# Patient Record
Sex: MALE | Race: BLACK OR AFRICAN AMERICAN | NOT HISPANIC OR LATINO | Employment: OTHER | URBAN - METROPOLITAN AREA
[De-identification: names, ages, dates, MRNs, and addresses within clinical notes are randomized per-mention and may not be internally consistent; named-entity substitution may affect disease eponyms.]

---

## 2017-01-16 ENCOUNTER — OFFICE VISIT (OUTPATIENT)
Dept: FAMILY MEDICINE CLINIC | Facility: CLINIC | Age: 72
End: 2017-01-16

## 2017-01-16 ENCOUNTER — HOSPITAL ENCOUNTER (OUTPATIENT)
Dept: GENERAL RADIOLOGY | Facility: HOSPITAL | Age: 72
Discharge: HOME | End: 2017-01-16
Attending: GENERAL PRACTICE | Admitting: GENERAL PRACTICE

## 2017-01-16 VITALS
TEMPERATURE: 98.2 F | BODY MASS INDEX: 28.09 KG/M2 | OXYGEN SATURATION: 98 % | SYSTOLIC BLOOD PRESSURE: 142 MMHG | DIASTOLIC BLOOD PRESSURE: 90 MMHG | WEIGHT: 179 LBS | HEIGHT: 67 IN | HEART RATE: 69 BPM

## 2017-01-16 DIAGNOSIS — M89.8X9 BONE PAIN: Primary | ICD-10-CM

## 2017-01-16 DIAGNOSIS — F17.200 SMOKER: ICD-10-CM

## 2017-01-16 DIAGNOSIS — R52 PAIN: ICD-10-CM

## 2017-01-16 PROCEDURE — 99213 OFFICE O/P EST LOW 20 MIN: CPT | Performed by: GENERAL PRACTICE

## 2017-01-16 PROCEDURE — 71020 HC CHEST PA AND LATERAL: CPT

## 2017-01-16 PROCEDURE — 93000 ELECTROCARDIOGRAM COMPLETE: CPT | Performed by: GENERAL PRACTICE

## 2017-01-16 RX ORDER — ASPIRIN 325 MG
325 TABLET ORAL DAILY
COMMUNITY
End: 2022-12-07

## 2017-01-16 NOTE — PROGRESS NOTES
Subjective   Mitesh Durham is a 71 y.o. male.     Chief Complaint   Patient presents with   • Chest Pain     Dull ache    • Sleeping Problem       History of Present Illness patient is a 71 y.o. very nice -American gentleman who is complaining of a pain around the fourth to fifth costochondral joint on the right side of the sternum been there for 6 weeks no history of injury this patient is a smoker has been for years this is not pre-cardial pain but it looks like may need an EKG to make sure                    The following portions of the patient's history were reviewed and updated as appropriate: allergies, current medications, past family history, past medical history, past social history, past surgical history and problem list.      Review of Systems   Respiratory:        Patient is a smoker   Musculoskeletal:        Costochondral pain fourth and fifth rib the right side for 4-6 weeks no history of injury         Objective   Physical Exam   Cardiovascular: Normal rate, regular rhythm and normal heart sounds.    Pulmonary/Chest: Effort normal and breath sounds normal.   Is a long-time smoker   Musculoskeletal:   Shouldn't complains of the pain and definitely to pressure in the area of the fourth the fifth costochondral joint on the right side of the sternum I may feel a bony enlargement in that area hard to tell we'll send a [facility] for rib detail in that area also plain films of the chest because he is a smoker         Assessment/Plan   Mitesh was seen today for chest pain and sleeping problem.    Diagnoses and all orders for this visit:    Bone pain  -     ECG 12 Lead    Smoker  -     ECG 12 Lead              Mitesh Blackwood MD  1/16/2017     Very nice -American 71-year-old lad who is here complaining of pain it appears to be at the fourth or fifth costochondral joint right side of the sternum which he says is been there for maybe 6 weeks no history of injury this patient is a smoker.  There is  pain with pressure over this area I'm going to do a EKG just to make sure do not feel this is cardiac then I will send him to [facility] for rib detail in that area on the right side of the sternum fifth or fourth rib costochondral joint also we will do a PA and lateral of the chest because he is a smoker patient's EKG was perfectly normal he is gone to [facility] for x-rays

## 2017-01-17 ENCOUNTER — TELEPHONE (OUTPATIENT)
Dept: FAMILY MEDICINE CLINIC | Facility: CLINIC | Age: 72
End: 2017-01-17

## 2017-01-17 ENCOUNTER — HOSPITAL ENCOUNTER (OUTPATIENT)
Dept: GENERAL RADIOLOGY | Facility: HOSPITAL | Age: 72
Discharge: HOME | End: 2017-01-17
Attending: GENERAL PRACTICE | Admitting: GENERAL PRACTICE

## 2017-01-17 DIAGNOSIS — R07.81 RIB PAIN ON RIGHT SIDE: ICD-10-CM

## 2017-01-17 DIAGNOSIS — F17.200 SMOKER: ICD-10-CM

## 2017-01-17 PROCEDURE — 71100 X-RAY EXAM RIBS UNI 2 VIEWS: CPT

## 2017-01-17 NOTE — TELEPHONE ENCOUNTER
I made a call to the x-ray department because rib detail that I had ordered was not done I did order lateral and PA of his chest but also reviewed detail they are going to call the patient bring him back because some reason they did not do the films that as I had ordered

## 2017-01-18 ENCOUNTER — TELEPHONE (OUTPATIENT)
Dept: FAMILY MEDICINE CLINIC | Facility: CLINIC | Age: 72
End: 2017-01-18

## 2017-01-18 NOTE — TELEPHONE ENCOUNTER
Patient was aware yesterday that his chest x-ray was okay and told him to quit smoking calling today to let him know that his rib detail films are normal there is no fracture there are no bony lesions anywhere's patient in the area he is complaining which is a costochondral joint next to the sternum told the patient again on some Aleve maybe twice a day and get back with me in a couple weeks to be still having discomfort I did tell him on the chest x-ray there was some evidence of COPD and that is from the smoking he must quit smoking

## 2020-06-09 ENCOUNTER — PATIENT OUTREACH (OUTPATIENT)
Dept: CASE MANAGEMENT | Facility: OTHER | Age: 75
End: 2020-06-09

## 2020-06-09 NOTE — OUTREACH NOTE
LVM for pt to return call to schedule AWARMAND Panchal  [facility] Clinical Coordinator  [phone]

## 2020-06-10 NOTE — OUTREACH NOTE
Spoke with pts wife, she states that pt is currently having surgery at [facility] and would like to postpone AWV for now.     ARMAND Allen  Network Clinical Coordinator  [phone]

## 2021-02-16 ENCOUNTER — PATIENT OUTREACH (OUTPATIENT)
Dept: PHARMACY | Facility: HOSPITAL | Age: 76
End: 2021-02-16

## 2022-12-07 ENCOUNTER — HOSPITAL ENCOUNTER (OUTPATIENT)
Dept: GENERAL RADIOLOGY | Facility: HOSPITAL | Age: 77
Discharge: HOME | End: 2022-12-07

## 2022-12-07 ENCOUNTER — PRE-ADMISSION TESTING (OUTPATIENT)
Dept: PREADMISSION TESTING | Facility: HOSPITAL | Age: 77
End: 2022-12-07

## 2022-12-07 VITALS
HEIGHT: 67 IN | OXYGEN SATURATION: 97 % | HEART RATE: 94 BPM | WEIGHT: 179.8 LBS | SYSTOLIC BLOOD PRESSURE: 163 MMHG | BODY MASS INDEX: 28.22 KG/M2 | TEMPERATURE: 97.5 F | RESPIRATION RATE: 20 BRPM | DIASTOLIC BLOOD PRESSURE: 82 MMHG

## 2022-12-07 LAB
ABO GROUP BLD: NORMAL
ALBUMIN SERPL-MCNC: 3.9 G/DL (ref 3.5–5.2)
ALBUMIN/GLOB SERPL: 1.6 G/DL
ALP SERPL-CCNC: 96 U/L (ref 39–117)
ALT SERPL W P-5'-P-CCNC: 10 U/L (ref 1–41)
ANION GAP SERPL CALCULATED.3IONS-SCNC: 10 MMOL/L (ref 5–15)
AST SERPL-CCNC: 12 U/L (ref 1–40)
BILIRUB SERPL-MCNC: 0.7 MG/DL (ref 0–1.2)
BLD GP AB SCN SERPL QL: NEGATIVE
BUN SERPL-MCNC: 15 MG/DL (ref 8–23)
BUN/CREAT SERPL: 11.8 (ref 7–25)
CALCIUM SPEC-SCNC: 9 MG/DL (ref 8.6–10.5)
CHLORIDE SERPL-SCNC: 104 MMOL/L (ref 98–107)
CO2 SERPL-SCNC: 27 MMOL/L (ref 22–29)
CREAT SERPL-MCNC: 1.27 MG/DL (ref 0.76–1.27)
DEPRECATED RDW RBC AUTO: 38.4 FL (ref 37–54)
EGFRCR SERPLBLD CKD-EPI 2021: 57.8 ML/MIN/1.73
ERYTHROCYTE [DISTWIDTH] IN BLOOD BY AUTOMATED COUNT: 12.1 % (ref 12.3–15.4)
GLOBULIN UR ELPH-MCNC: 2.5 GM/DL
GLUCOSE SERPL-MCNC: 102 MG/DL (ref 65–99)
HBA1C MFR BLD: 6.2 % (ref 4.8–5.6)
HCT VFR BLD AUTO: 40.6 % (ref 37.5–51)
HGB BLD-MCNC: 13.1 G/DL (ref 13–17.7)
INR PPP: 1.01 (ref 0.9–1.1)
MCH RBC QN AUTO: 27.8 PG (ref 26.6–33)
MCHC RBC AUTO-ENTMCNC: 32.3 G/DL (ref 31.5–35.7)
MCV RBC AUTO: 86.2 FL (ref 79–97)
PLATELET # BLD AUTO: 248 10*3/MM3 (ref 140–450)
PMV BLD AUTO: 9.6 FL (ref 6–12)
POTASSIUM SERPL-SCNC: 4.3 MMOL/L (ref 3.5–5.2)
PROT SERPL-MCNC: 6.4 G/DL (ref 6–8.5)
PROTHROMBIN TIME: 13.4 SECONDS (ref 11.7–14.2)
QT INTERVAL: 370 MS
RBC # BLD AUTO: 4.71 10*6/MM3 (ref 4.14–5.8)
RH BLD: POSITIVE
SODIUM SERPL-SCNC: 141 MMOL/L (ref 136–145)
T&S EXPIRATION DATE: NORMAL
WBC NRBC COR # BLD: 11.72 10*3/MM3 (ref 3.4–10.8)

## 2022-12-07 PROCEDURE — 80053 COMPREHEN METABOLIC PANEL: CPT

## 2022-12-07 PROCEDURE — 86850 RBC ANTIBODY SCREEN: CPT

## 2022-12-07 PROCEDURE — 71046 X-RAY EXAM CHEST 2 VIEWS: CPT

## 2022-12-07 PROCEDURE — 85027 COMPLETE CBC AUTOMATED: CPT

## 2022-12-07 PROCEDURE — 73502 X-RAY EXAM HIP UNI 2-3 VIEWS: CPT

## 2022-12-07 PROCEDURE — 86900 BLOOD TYPING SEROLOGIC ABO: CPT

## 2022-12-07 PROCEDURE — 93005 ELECTROCARDIOGRAM TRACING: CPT

## 2022-12-07 PROCEDURE — 85610 PROTHROMBIN TIME: CPT

## 2022-12-07 PROCEDURE — 36415 COLL VENOUS BLD VENIPUNCTURE: CPT

## 2022-12-07 PROCEDURE — 93010 ELECTROCARDIOGRAM REPORT: CPT | Performed by: INTERNAL MEDICINE

## 2022-12-07 PROCEDURE — 83036 HEMOGLOBIN GLYCOSYLATED A1C: CPT

## 2022-12-07 PROCEDURE — 86901 BLOOD TYPING SEROLOGIC RH(D): CPT

## 2022-12-07 RX ORDER — TRAMADOL HYDROCHLORIDE 50 MG/1
50 TABLET ORAL NIGHTLY
COMMUNITY

## 2022-12-07 ASSESSMENT — HOOS JR
HOOS JR SCORE: 7
HOOS JR SCORE: 68.284

## 2022-12-07 NOTE — DISCHARGE INSTRUCTIONS
Take the following medications the morning of surgery:  AMLODIPINE(NORVASC)  IF YOU ARE TAKING THIS    ARRIVE TO OUTPATIENT SURGERY CENTER 12-15-22 AT 5:15 AM    If you are on prescription narcotic pain medication to control your pain you may also take that medication the morning of surgery.    General Instructions:  Do not eat solid food after midnight the night before surgery.  You may drink clear liquids day of surgery but must stop at least one hour before your hospital arrival time.  It is beneficial for you to have a clear drink that contains carbohydrates the day of surgery.  We suggest a 12 to 20 ounce bottle of Gatorade or Powerade for non-diabetic patients or a 12 to 20 ounce bottle of G2 or Powerade Zero for diabetic patients. (Pediatric patients, are not advised to drink a 12 to 20 ounce carbohydrate drink)    Clear liquids are liquids you can see through.  Nothing red in color.     Plain water                               Sports drinks  Sodas                                   Gelatin (Jell-O)  Fruit juices without pulp such as white grape juice and apple juice  Popsicles that contain no fruit or yogurt  Tea or coffee (no cream or milk added)  Gatorade / Powerade  G2 / Powerade Zero    Infants may have breast milk up to four hours before surgery.  Infants drinking formula may drink formula up to six hours before surgery.   Patients who avoid smoking, chewing tobacco and alcohol for 4 weeks prior to surgery have a reduced risk of post-operative complications.  Quit smoking as many days before surgery as you can.  Do not smoke, use chewing tobacco or drink alcohol the day of surgery.   If applicable bring your C-PAP/ BI-PAP machine.  Bring any papers given to you in the doctor’s office.  Wear clean comfortable clothes.  Do not wear contact lenses, false eyelashes or make-up.  Bring a case for your glasses.   Bring crutches or walker if applicable.  Remove all piercings.  Leave jewelry and any other  valuables at home.  Hair extensions with metal clips must be removed prior to surgery.  The Pre-Admission Testing nurse will instruct you to bring medications if unable to obtain an accurate list in Pre-Admission Testing.        If you were given a blood bank ID arm band remember to bring it with you the day of surgery.    Preventing a Surgical Site Infection:  For 2 to 3 days before surgery, avoid shaving with a razor because the razor can irritate skin and make it easier to develop an infection.    Any areas of open skin can increase the risk of a post-operative wound infection by allowing bacteria to enter and travel throughout the body.  Notify your surgeon if you have any skin wounds / rashes even if it is not near the expected surgical site.  The area will need assessed to determine if surgery should be delayed until it is healed.  The night prior to surgery shower using a fresh bar of anti-bacterial soap (such as Dial) and clean washcloth.  Sleep in a clean bed with clean clothing.  Do not allow pets to sleep with you.  Shower on the morning of surgery using a fresh bar of anti-bacterial soap (such as Dial) and clean washcloth.  Dry with a clean towel and dress in clean clothing.  Ask your surgeon if you will be receiving antibiotics prior to surgery.  Make sure you, your family, and all healthcare providers clean their hands with soap and water or an alcohol based hand  before caring for you or your wound.    Day of surgery:  Your arrival time is approximately two hours before your scheduled surgery time.  Upon arrival, a Pre-op nurse and Anesthesiologist will review your health history, obtain vital signs, and answer questions you may have.  The only belongings needed at this time will be a list of your home medications and if applicable your C-PAP/BI-PAP machine.  A Pre-op nurse will start an IV and you may receive medication in preparation for surgery, including something to help you relax.      Please be aware that surgery does come with discomfort.  We want to make every effort to control your discomfort so please discuss any uncontrolled symptoms with your nurse.   Your doctor will most likely have prescribed pain medications.      If you are going home after surgery you will receive individualized written care instructions before being discharged.  A responsible adult must drive you to and from the hospital on the day of your surgery and stay with you for 24 hours.  Discharge prescriptions can be filled by the hospital pharmacy during regular pharmacy hours.  If you are having surgery late in the day/evening your prescription may be e-prescribed to your pharmacy.  Please verify your pharmacy hours or chose a 24 hour pharmacy to avoid not having access to your prescription because your pharmacy has closed for the day.    If you are staying overnight following surgery, you will be transported to your hospital room following the recovery period.  [facility] has all private rooms.    If you have any questions please call Pre-Admission Testing at [phone].  Deductibles and co-payments are collected on the day of service. Please be prepared to pay the required co-pay, deductible or deposit on the day of service as defined by your plan.    Call your surgeon immediately if you experience any of the following symptoms:  Sore Throat  Shortness of Breath or difficulty breathing  Cough  Chills  Body soreness or muscle pain  Headache  Fever  New loss of taste or smell  Do not arrive for your surgery ill.  Your procedure will need to be rescheduled to another time.  You will need to call your physician before the day of surgery to avoid any unnecessary exposure to hospital staff as well as other patients.       CHLORHEXIDINE CLOTH INSTRUCTIONS  The morning of surgery follow these instructions using the Chlorhexidine cloths you've been given.  These steps reduce bacteria on the body.  Do not  use the cloths near your eyes, ears mouth, genitalia or on open wounds.  Throw the cloths away after use but do not try to flush them down a toilet.      Open and remove one cloth at a time from the package.    Leave the cloth unfolded and begin the bathing.  Massage the skin with the cloths using gentle pressure to remove bacteria.  Do not scrub harshly.   Follow the steps below with one 2% CHG cloth per area (6 total cloths).  One cloth for neck, shoulders and chest.  One cloth for both arms, hands, fingers and underarms (do underarms last).  One cloth for the abdomen followed by groin.  One cloth for right leg and foot including between the toes.  One cloth for left leg and foot including between the toes.  The last cloth is to be used for the back of the neck, back and buttocks.    Allow the CHG to air dry 3 minutes on the skin which will give it time to work and decrease the chance of irritation.  The skin may feel sticky until it is dry.  Do not rinse with water or any other liquid or you will lose the beneficial effects of the CHG.  If mild skin irritation occurs, do rinse the skin to remove the CHG.  Report this to the nurse at time of admission.  Do not apply lotions, creams, ointments, deodorants or perfumes after using the clothes. Dress in clean clothes before coming to the hospital.

## 2022-12-15 ENCOUNTER — APPOINTMENT (OUTPATIENT)
Dept: GENERAL RADIOLOGY | Facility: HOSPITAL | Age: 77
End: 2022-12-15

## 2022-12-15 ENCOUNTER — ANESTHESIA EVENT (OUTPATIENT)
Dept: PERIOP | Facility: HOSPITAL | Age: 77
End: 2022-12-15

## 2022-12-15 ENCOUNTER — HOSPITAL ENCOUNTER (OUTPATIENT)
Facility: HOSPITAL | Age: 77
Discharge: HOME | End: 2022-12-16
Attending: ORTHOPAEDIC SURGERY | Admitting: ORTHOPAEDIC SURGERY

## 2022-12-15 ENCOUNTER — ANESTHESIA (OUTPATIENT)
Dept: PERIOP | Facility: HOSPITAL | Age: 77
End: 2022-12-15

## 2022-12-15 DIAGNOSIS — M16.12 PRIMARY OSTEOARTHRITIS OF LEFT HIP: Primary | ICD-10-CM

## 2022-12-15 PROCEDURE — 73501 X-RAY EXAM HIP UNI 1 VIEW: CPT

## 2022-12-15 PROCEDURE — 25010000002 CEFAZOLIN IN DEXTROSE 2-4 GM/100ML-% SOLUTION: Performed by: ORTHOPAEDIC SURGERY

## 2022-12-15 PROCEDURE — C1713 ANCHOR/SCREW BN/BN,TIS/BN: HCPCS | Performed by: ORTHOPAEDIC SURGERY

## 2022-12-15 PROCEDURE — 25010000002 PROPOFOL 10 MG/ML EMULSION: Performed by: NURSE ANESTHETIST, CERTIFIED REGISTERED

## 2022-12-15 PROCEDURE — 25010000002 FENTANYL CITRATE (PF) 100 MCG/2ML SOLUTION: Performed by: NURSE ANESTHETIST, CERTIFIED REGISTERED

## 2022-12-15 PROCEDURE — 25010000002 MORPHINE PER 10 MG: Performed by: ORTHOPAEDIC SURGERY

## 2022-12-15 PROCEDURE — 25010000002 FENTANYL CITRATE (PF) 50 MCG/ML SOLUTION: Performed by: ANESTHESIOLOGY

## 2022-12-15 PROCEDURE — G0378 HOSPITAL OBSERVATION PER HR: HCPCS

## 2022-12-15 PROCEDURE — 25010000002 EPINEPHRINE 1 MG/ML SOLUTION 30 ML VIAL: Performed by: ORTHOPAEDIC SURGERY

## 2022-12-15 PROCEDURE — C1776 JOINT DEVICE (IMPLANTABLE): HCPCS | Performed by: ORTHOPAEDIC SURGERY

## 2022-12-15 PROCEDURE — 25010000002 KETOROLAC TROMETHAMINE PER 15 MG: Performed by: ORTHOPAEDIC SURGERY

## 2022-12-15 PROCEDURE — 97161 PT EVAL LOW COMPLEX 20 MIN: CPT

## 2022-12-15 PROCEDURE — 97530 THERAPEUTIC ACTIVITIES: CPT

## 2022-12-15 PROCEDURE — 76942 ECHO GUIDE FOR BIOPSY: CPT | Performed by: ORTHOPAEDIC SURGERY

## 2022-12-15 PROCEDURE — 25010000002 CEFAZOLIN IN DEXTROSE 2-4 GM/100ML-% SOLUTION: Performed by: NURSE ANESTHETIST, CERTIFIED REGISTERED

## 2022-12-15 PROCEDURE — 25010000002 HYDROMORPHONE 1 MG/ML SOLUTION: Performed by: NURSE ANESTHETIST, CERTIFIED REGISTERED

## 2022-12-15 PROCEDURE — 25010000002 ROPIVACAINE PER 1 MG: Performed by: ORTHOPAEDIC SURGERY

## 2022-12-15 PROCEDURE — 25010000002 ONDANSETRON PER 1 MG: Performed by: NURSE ANESTHETIST, CERTIFIED REGISTERED

## 2022-12-15 PROCEDURE — 97116 GAIT TRAINING THERAPY: CPT

## 2022-12-15 PROCEDURE — 25010000002 NEOSTIGMINE 5 MG/10ML SOLUTION: Performed by: NURSE ANESTHETIST, CERTIFIED REGISTERED

## 2022-12-15 DEVICE — SHLL ACET OSSEOTI G7 4H SZF 56MM: Type: IMPLANTABLE DEVICE | Site: HIP | Status: FUNCTIONAL

## 2022-12-15 DEVICE — TOTAL HIP PRIMARY: Type: IMPLANTABLE DEVICE | Site: HIP | Status: FUNCTIONAL

## 2022-12-15 DEVICE — STEM FEM/HIP TAPERLOC COMPL DIST/REDUC PPS OFFST/STD SZ13: Type: IMPLANTABLE DEVICE | Site: HIP | Status: FUNCTIONAL

## 2022-12-15 DEVICE — BONE WAX
Type: IMPLANTABLE DEVICE | Site: HIP | Status: FUNCTIONAL
Brand: ETHICON

## 2022-12-15 DEVICE — IMPLANTABLE DEVICE
Type: IMPLANTABLE DEVICE | Site: HIP | Status: FUNCTIONAL
Brand: BIOLOX® OPTION

## 2022-12-15 DEVICE — IMPLANTABLE DEVICE
Type: IMPLANTABLE DEVICE | Site: HIP | Status: FUNCTIONAL
Brand: BIOLOX® DELTA OPTION

## 2022-12-15 DEVICE — SCRW ACET CORT TRILOGY S/TAP 6.5X35: Type: IMPLANTABLE DEVICE | Site: HIP | Status: FUNCTIONAL

## 2022-12-15 DEVICE — SCRW ACET CORT TRILOGY S/TAP 6.5X25: Type: IMPLANTABLE DEVICE | Site: HIP | Status: FUNCTIONAL

## 2022-12-15 DEVICE — CP HIP UPCHRG OSSEOTI LTD HL CUPS: Type: IMPLANTABLE DEVICE | Site: HIP | Status: FUNCTIONAL

## 2022-12-15 DEVICE — LINER ACET G7 HI/WL E1 SZF 40MM: Type: IMPLANTABLE DEVICE | Site: HIP | Status: FUNCTIONAL

## 2022-12-15 RX ORDER — NALOXONE HCL 0.4 MG/ML
0.4 VIAL (ML) INJECTION
Status: DISCONTINUED | OUTPATIENT
Start: 2022-12-15 | End: 2022-12-16 | Stop reason: HOSPADM

## 2022-12-15 RX ORDER — OXYCODONE AND ACETAMINOPHEN 7.5; 325 MG/1; MG/1
1 TABLET ORAL ONCE AS NEEDED
Status: DISCONTINUED | OUTPATIENT
Start: 2022-12-15 | End: 2022-12-15 | Stop reason: HOSPADM

## 2022-12-15 RX ORDER — SODIUM CHLORIDE, SODIUM LACTATE, POTASSIUM CHLORIDE, CALCIUM CHLORIDE 600; 310; 30; 20 MG/100ML; MG/100ML; MG/100ML; MG/100ML
9 INJECTION, SOLUTION INTRAVENOUS CONTINUOUS
Status: DISCONTINUED | OUTPATIENT
Start: 2022-12-15 | End: 2022-12-16 | Stop reason: HOSPADM

## 2022-12-15 RX ORDER — MORPHINE SULFATE 4 MG/ML
4 INJECTION, SOLUTION INTRAMUSCULAR; INTRAVENOUS
Status: DISCONTINUED | OUTPATIENT
Start: 2022-12-15 | End: 2022-12-16 | Stop reason: HOSPADM

## 2022-12-15 RX ORDER — ONDANSETRON 4 MG/1
4 TABLET, FILM COATED ORAL EVERY 6 HOURS PRN
Status: DISCONTINUED | OUTPATIENT
Start: 2022-12-15 | End: 2022-12-16 | Stop reason: HOSPADM

## 2022-12-15 RX ORDER — ONDANSETRON 2 MG/ML
4 INJECTION INTRAMUSCULAR; INTRAVENOUS EVERY 6 HOURS PRN
Status: DISCONTINUED | OUTPATIENT
Start: 2022-12-15 | End: 2022-12-16 | Stop reason: HOSPADM

## 2022-12-15 RX ORDER — LABETALOL HYDROCHLORIDE 5 MG/ML
5 INJECTION, SOLUTION INTRAVENOUS
Status: DISCONTINUED | OUTPATIENT
Start: 2022-12-15 | End: 2022-12-15 | Stop reason: HOSPADM

## 2022-12-15 RX ORDER — OXYCODONE HYDROCHLORIDE AND ACETAMINOPHEN 5; 325 MG/1; MG/1
1-2 TABLET ORAL EVERY 4 HOURS PRN
Qty: 60 TABLET | Refills: 0 | Status: SHIPPED | OUTPATIENT
Start: 2022-12-15

## 2022-12-15 RX ORDER — CHOLECALCIFEROL (VITAMIN D3) 125 MCG
5 CAPSULE ORAL NIGHTLY PRN
Status: DISCONTINUED | OUTPATIENT
Start: 2022-12-15 | End: 2022-12-16 | Stop reason: HOSPADM

## 2022-12-15 RX ORDER — MAGNESIUM HYDROXIDE 1200 MG/15ML
LIQUID ORAL AS NEEDED
Status: DISCONTINUED | OUTPATIENT
Start: 2022-12-15 | End: 2022-12-15 | Stop reason: HOSPADM

## 2022-12-15 RX ORDER — FAMOTIDINE 10 MG/ML
20 INJECTION, SOLUTION INTRAVENOUS ONCE
Status: COMPLETED | OUTPATIENT
Start: 2022-12-15 | End: 2022-12-15

## 2022-12-15 RX ORDER — ENALAPRILAT 2.5 MG/2ML
1.25 INJECTION INTRAVENOUS ONCE AS NEEDED
Status: DISCONTINUED | OUTPATIENT
Start: 2022-12-15 | End: 2022-12-15 | Stop reason: HOSPADM

## 2022-12-15 RX ORDER — PROMETHAZINE HYDROCHLORIDE 25 MG/1
25 SUPPOSITORY RECTAL ONCE AS NEEDED
Status: DISCONTINUED | OUTPATIENT
Start: 2022-12-15 | End: 2022-12-15 | Stop reason: HOSPADM

## 2022-12-15 RX ORDER — OXYCODONE HYDROCHLORIDE AND ACETAMINOPHEN 5; 325 MG/1; MG/1
2 TABLET ORAL EVERY 4 HOURS PRN
Status: DISCONTINUED | OUTPATIENT
Start: 2022-12-15 | End: 2022-12-16 | Stop reason: HOSPADM

## 2022-12-15 RX ORDER — GLYCOPYRROLATE 0.2 MG/ML
INJECTION INTRAMUSCULAR; INTRAVENOUS AS NEEDED
Status: DISCONTINUED | OUTPATIENT
Start: 2022-12-15 | End: 2022-12-15 | Stop reason: SURG

## 2022-12-15 RX ORDER — CEFAZOLIN SODIUM 2 G/100ML
INJECTION, SOLUTION INTRAVENOUS AS NEEDED
Status: DISCONTINUED | OUTPATIENT
Start: 2022-12-15 | End: 2022-12-15 | Stop reason: SURG

## 2022-12-15 RX ORDER — ASPIRIN 81 MG/1
81 TABLET ORAL DAILY
Qty: 45 TABLET | Refills: 0 | Status: SHIPPED | OUTPATIENT
Start: 2022-12-16

## 2022-12-15 RX ORDER — LISINOPRIL 20 MG/1
20 TABLET ORAL DAILY
Status: DISCONTINUED | OUTPATIENT
Start: 2022-12-15 | End: 2022-12-16 | Stop reason: HOSPADM

## 2022-12-15 RX ORDER — HYDROMORPHONE HYDROCHLORIDE 1 MG/ML
0.25 INJECTION, SOLUTION INTRAMUSCULAR; INTRAVENOUS; SUBCUTANEOUS
Status: DISCONTINUED | OUTPATIENT
Start: 2022-12-15 | End: 2022-12-15 | Stop reason: HOSPADM

## 2022-12-15 RX ORDER — ONDANSETRON 2 MG/ML
4 INJECTION INTRAMUSCULAR; INTRAVENOUS ONCE AS NEEDED
Status: DISCONTINUED | OUTPATIENT
Start: 2022-12-15 | End: 2022-12-15 | Stop reason: HOSPADM

## 2022-12-15 RX ORDER — DIPHENHYDRAMINE HCL 25 MG
25 CAPSULE ORAL EVERY 6 HOURS PRN
Status: DISCONTINUED | OUTPATIENT
Start: 2022-12-15 | End: 2022-12-16 | Stop reason: HOSPADM

## 2022-12-15 RX ORDER — KETOROLAC TROMETHAMINE 15 MG/ML
15 INJECTION, SOLUTION INTRAMUSCULAR; INTRAVENOUS EVERY 6 HOURS PRN
Status: DISCONTINUED | OUTPATIENT
Start: 2022-12-15 | End: 2022-12-16 | Stop reason: HOSPADM

## 2022-12-15 RX ORDER — NALOXONE HCL 0.4 MG/ML
0.4 VIAL (ML) INJECTION AS NEEDED
Status: DISCONTINUED | OUTPATIENT
Start: 2022-12-15 | End: 2022-12-15 | Stop reason: HOSPADM

## 2022-12-15 RX ORDER — ACETAMINOPHEN 325 MG/1
325 TABLET ORAL EVERY 4 HOURS PRN
Status: DISCONTINUED | OUTPATIENT
Start: 2022-12-15 | End: 2022-12-16 | Stop reason: HOSPADM

## 2022-12-15 RX ORDER — ONDANSETRON 2 MG/ML
INJECTION INTRAMUSCULAR; INTRAVENOUS AS NEEDED
Status: DISCONTINUED | OUTPATIENT
Start: 2022-12-15 | End: 2022-12-15 | Stop reason: SURG

## 2022-12-15 RX ORDER — FENTANYL CITRATE 50 UG/ML
50 INJECTION, SOLUTION INTRAMUSCULAR; INTRAVENOUS
Status: DISCONTINUED | OUTPATIENT
Start: 2022-12-15 | End: 2022-12-15 | Stop reason: HOSPADM

## 2022-12-15 RX ORDER — PROMETHAZINE HYDROCHLORIDE 12.5 MG/1
12.5 TABLET ORAL EVERY 6 HOURS PRN
Status: DISCONTINUED | OUTPATIENT
Start: 2022-12-15 | End: 2022-12-16 | Stop reason: HOSPADM

## 2022-12-15 RX ORDER — TRAMADOL HYDROCHLORIDE 50 MG/1
50 TABLET ORAL EVERY 4 HOURS PRN
Status: DISCONTINUED | OUTPATIENT
Start: 2022-12-15 | End: 2022-12-16 | Stop reason: HOSPADM

## 2022-12-15 RX ORDER — DOCUSATE SODIUM 250 MG
250 CAPSULE ORAL 2 TIMES DAILY PRN
Qty: 30 CAPSULE | Refills: 1 | Status: SHIPPED | OUTPATIENT
Start: 2022-12-15

## 2022-12-15 RX ORDER — PROMETHAZINE HYDROCHLORIDE 25 MG/1
25 TABLET ORAL ONCE AS NEEDED
Status: DISCONTINUED | OUTPATIENT
Start: 2022-12-15 | End: 2022-12-15 | Stop reason: HOSPADM

## 2022-12-15 RX ORDER — POVIDONE-IODINE 10 MG/ML
1 SOLUTION TOPICAL ONCE
Status: COMPLETED | OUTPATIENT
Start: 2022-12-15 | End: 2022-12-15

## 2022-12-15 RX ORDER — ASPIRIN 81 MG/1
81 TABLET ORAL DAILY
Status: DISCONTINUED | OUTPATIENT
Start: 2022-12-16 | End: 2022-12-16 | Stop reason: HOSPADM

## 2022-12-15 RX ORDER — CLOPIDOGREL BISULFATE 75 MG/1
75 TABLET ORAL DAILY
Status: DISCONTINUED | OUTPATIENT
Start: 2022-12-16 | End: 2022-12-16 | Stop reason: HOSPADM

## 2022-12-15 RX ORDER — MIDAZOLAM HYDROCHLORIDE 1 MG/ML
0.5 INJECTION INTRAMUSCULAR; INTRAVENOUS
Status: DISCONTINUED | OUTPATIENT
Start: 2022-12-15 | End: 2022-12-15 | Stop reason: HOSPADM

## 2022-12-15 RX ORDER — SODIUM CHLORIDE 0.9 % (FLUSH) 0.9 %
3 SYRINGE (ML) INJECTION EVERY 12 HOURS SCHEDULED
Status: DISCONTINUED | OUTPATIENT
Start: 2022-12-15 | End: 2022-12-15 | Stop reason: HOSPADM

## 2022-12-15 RX ORDER — SODIUM CHLORIDE 9 MG/ML
40 INJECTION, SOLUTION INTRAVENOUS AS NEEDED
Status: DISCONTINUED | OUTPATIENT
Start: 2022-12-15 | End: 2022-12-16 | Stop reason: HOSPADM

## 2022-12-15 RX ORDER — DIPHENHYDRAMINE HYDROCHLORIDE 50 MG/ML
12.5 INJECTION INTRAMUSCULAR; INTRAVENOUS
Status: DISCONTINUED | OUTPATIENT
Start: 2022-12-15 | End: 2022-12-15 | Stop reason: HOSPADM

## 2022-12-15 RX ORDER — ROCURONIUM BROMIDE 10 MG/ML
INJECTION, SOLUTION INTRAVENOUS AS NEEDED
Status: DISCONTINUED | OUTPATIENT
Start: 2022-12-15 | End: 2022-12-15 | Stop reason: SURG

## 2022-12-15 RX ORDER — SODIUM CHLORIDE 0.9 % (FLUSH) 0.9 %
3-10 SYRINGE (ML) INJECTION AS NEEDED
Status: DISCONTINUED | OUTPATIENT
Start: 2022-12-15 | End: 2022-12-15 | Stop reason: HOSPADM

## 2022-12-15 RX ORDER — BUPIVACAINE HYDROCHLORIDE AND EPINEPHRINE 2.5; 5 MG/ML; UG/ML
INJECTION, SOLUTION EPIDURAL; INFILTRATION; INTRACAUDAL; PERINEURAL
Status: COMPLETED | OUTPATIENT
Start: 2022-12-15 | End: 2022-12-15

## 2022-12-15 RX ORDER — CEFAZOLIN SODIUM 2 G/100ML
2 INJECTION, SOLUTION INTRAVENOUS ONCE
Status: COMPLETED | OUTPATIENT
Start: 2022-12-15 | End: 2022-12-15

## 2022-12-15 RX ORDER — TEMAZEPAM 15 MG/1
30 CAPSULE ORAL NIGHTLY PRN
Status: DISCONTINUED | OUTPATIENT
Start: 2022-12-15 | End: 2022-12-16 | Stop reason: HOSPADM

## 2022-12-15 RX ORDER — NEOSTIGMINE METHYLSULFATE 0.5 MG/ML
INJECTION, SOLUTION INTRAVENOUS AS NEEDED
Status: DISCONTINUED | OUTPATIENT
Start: 2022-12-15 | End: 2022-12-15 | Stop reason: SURG

## 2022-12-15 RX ORDER — LIDOCAINE HYDROCHLORIDE 20 MG/ML
INJECTION, SOLUTION INFILTRATION; PERINEURAL AS NEEDED
Status: DISCONTINUED | OUTPATIENT
Start: 2022-12-15 | End: 2022-12-15 | Stop reason: SURG

## 2022-12-15 RX ORDER — CLINDAMYCIN PHOSPHATE 900 MG/50ML
900 INJECTION INTRAVENOUS EVERY 8 HOURS
Status: COMPLETED | OUTPATIENT
Start: 2022-12-15 | End: 2022-12-15

## 2022-12-15 RX ORDER — ACETAMINOPHEN 500 MG
1000 TABLET ORAL ONCE
Status: COMPLETED | OUTPATIENT
Start: 2022-12-15 | End: 2022-12-15

## 2022-12-15 RX ORDER — DIAZEPAM 5 MG/1
5 TABLET ORAL EVERY 6 HOURS PRN
Status: DISCONTINUED | OUTPATIENT
Start: 2022-12-15 | End: 2022-12-16 | Stop reason: HOSPADM

## 2022-12-15 RX ORDER — LIDOCAINE HYDROCHLORIDE 10 MG/ML
0.5 INJECTION, SOLUTION EPIDURAL; INFILTRATION; INTRACAUDAL; PERINEURAL ONCE AS NEEDED
Status: DISCONTINUED | OUTPATIENT
Start: 2022-12-15 | End: 2022-12-15 | Stop reason: HOSPADM

## 2022-12-15 RX ORDER — SODIUM CHLORIDE 0.9 % (FLUSH) 0.9 %
10 SYRINGE (ML) INJECTION EVERY 12 HOURS SCHEDULED
Status: DISCONTINUED | OUTPATIENT
Start: 2022-12-15 | End: 2022-12-16 | Stop reason: HOSPADM

## 2022-12-15 RX ORDER — DOCUSATE SODIUM 100 MG/1
100 CAPSULE, LIQUID FILLED ORAL 2 TIMES DAILY PRN
Status: DISCONTINUED | OUTPATIENT
Start: 2022-12-15 | End: 2022-12-16 | Stop reason: HOSPADM

## 2022-12-15 RX ORDER — DIPHENHYDRAMINE HYDROCHLORIDE 50 MG/ML
12.5 INJECTION INTRAMUSCULAR; INTRAVENOUS EVERY 6 HOURS PRN
Status: DISCONTINUED | OUTPATIENT
Start: 2022-12-15 | End: 2022-12-16 | Stop reason: HOSPADM

## 2022-12-15 RX ORDER — SODIUM CHLORIDE 450 MG/100ML
100 INJECTION, SOLUTION INTRAVENOUS CONTINUOUS
Status: DISCONTINUED | OUTPATIENT
Start: 2022-12-15 | End: 2022-12-16 | Stop reason: HOSPADM

## 2022-12-15 RX ORDER — PROPOFOL 10 MG/ML
VIAL (ML) INTRAVENOUS AS NEEDED
Status: DISCONTINUED | OUTPATIENT
Start: 2022-12-15 | End: 2022-12-15 | Stop reason: SURG

## 2022-12-15 RX ORDER — CELECOXIB 200 MG/1
200 CAPSULE ORAL ONCE
Status: COMPLETED | OUTPATIENT
Start: 2022-12-15 | End: 2022-12-15

## 2022-12-15 RX ORDER — SODIUM CHLORIDE 0.9 % (FLUSH) 0.9 %
1-10 SYRINGE (ML) INJECTION AS NEEDED
Status: DISCONTINUED | OUTPATIENT
Start: 2022-12-15 | End: 2022-12-16 | Stop reason: HOSPADM

## 2022-12-15 RX ORDER — HYDROCODONE BITARTRATE AND ACETAMINOPHEN 5; 325 MG/1; MG/1
1 TABLET ORAL ONCE AS NEEDED
Status: DISCONTINUED | OUTPATIENT
Start: 2022-12-15 | End: 2022-12-15 | Stop reason: HOSPADM

## 2022-12-15 RX ORDER — OXYCODONE HYDROCHLORIDE AND ACETAMINOPHEN 5; 325 MG/1; MG/1
1 TABLET ORAL EVERY 4 HOURS PRN
Status: DISCONTINUED | OUTPATIENT
Start: 2022-12-15 | End: 2022-12-16 | Stop reason: HOSPADM

## 2022-12-15 RX ORDER — ATORVASTATIN CALCIUM 20 MG/1
20 TABLET, FILM COATED ORAL NIGHTLY
Status: DISCONTINUED | OUTPATIENT
Start: 2022-12-15 | End: 2022-12-16 | Stop reason: HOSPADM

## 2022-12-15 RX ORDER — FERROUS SULFATE 325(65) MG
325 TABLET ORAL
Status: DISCONTINUED | OUTPATIENT
Start: 2022-12-15 | End: 2022-12-16 | Stop reason: HOSPADM

## 2022-12-15 RX ORDER — TRANEXAMIC ACID 100 MG/ML
INJECTION, SOLUTION INTRAVENOUS AS NEEDED
Status: DISCONTINUED | OUTPATIENT
Start: 2022-12-15 | End: 2022-12-15 | Stop reason: SURG

## 2022-12-15 RX ORDER — FENTANYL CITRATE 50 UG/ML
INJECTION, SOLUTION INTRAMUSCULAR; INTRAVENOUS AS NEEDED
Status: DISCONTINUED | OUTPATIENT
Start: 2022-12-15 | End: 2022-12-15 | Stop reason: SURG

## 2022-12-15 RX ORDER — AMLODIPINE BESYLATE 5 MG/1
5 TABLET ORAL
Status: DISCONTINUED | OUTPATIENT
Start: 2022-12-15 | End: 2022-12-16 | Stop reason: HOSPADM

## 2022-12-15 RX ADMIN — GLYCOPYRROLATE 0.2 MG: 1 INJECTION INTRAMUSCULAR; INTRAVENOUS at 07:31

## 2022-12-15 RX ADMIN — NEOSTIGMINE METHYLSULFATE 2 MG: 0.5 INJECTION INTRAVENOUS at 08:22

## 2022-12-15 RX ADMIN — SODIUM CHLORIDE 100 ML/HR: 4.5 INJECTION, SOLUTION INTRAVENOUS at 11:53

## 2022-12-15 RX ADMIN — CLINDAMYCIN PHOSPHATE 900 MG: 900 INJECTION, SOLUTION INTRAVENOUS at 21:54

## 2022-12-15 RX ADMIN — POVIDONE-IODINE 1 EACH: 10 SOLUTION TOPICAL at 06:28

## 2022-12-15 RX ADMIN — OXYCODONE AND ACETAMINOPHEN 1 TABLET: 5; 325 TABLET ORAL at 19:43

## 2022-12-15 RX ADMIN — GLYCOPYRROLATE 0.3 MG: 1 INJECTION INTRAMUSCULAR; INTRAVENOUS at 08:22

## 2022-12-15 RX ADMIN — SODIUM CHLORIDE, POTASSIUM CHLORIDE, SODIUM LACTATE AND CALCIUM CHLORIDE: 600; 310; 30; 20 INJECTION, SOLUTION INTRAVENOUS at 06:58

## 2022-12-15 RX ADMIN — MUPIROCIN 1 APPLICATION: 20 OINTMENT TOPICAL at 14:21

## 2022-12-15 RX ADMIN — CEFAZOLIN SODIUM 2 G: 2 INJECTION, SOLUTION INTRAVENOUS at 06:52

## 2022-12-15 RX ADMIN — FENTANYL CITRATE 50 MCG: 50 INJECTION, SOLUTION INTRAMUSCULAR; INTRAVENOUS at 06:28

## 2022-12-15 RX ADMIN — PROPOFOL 130 MG: 10 INJECTION, EMULSION INTRAVENOUS at 07:03

## 2022-12-15 RX ADMIN — LIDOCAINE HYDROCHLORIDE 80 MG: 20 INJECTION, SOLUTION INFILTRATION; PERINEURAL at 07:03

## 2022-12-15 RX ADMIN — FERROUS SULFATE TAB 325 MG (65 MG ELEMENTAL FE) 325 MG: 325 (65 FE) TAB at 14:21

## 2022-12-15 RX ADMIN — ACETAMINOPHEN 1000 MG: 500 TABLET ORAL at 05:52

## 2022-12-15 RX ADMIN — FENTANYL CITRATE 50 MCG: 50 INJECTION, SOLUTION INTRAMUSCULAR; INTRAVENOUS at 07:03

## 2022-12-15 RX ADMIN — OXYCODONE AND ACETAMINOPHEN 1 TABLET: 5; 325 TABLET ORAL at 14:51

## 2022-12-15 RX ADMIN — HYDROMORPHONE HYDROCHLORIDE 0.5 MG: 1 INJECTION, SOLUTION INTRAMUSCULAR; INTRAVENOUS; SUBCUTANEOUS at 08:12

## 2022-12-15 RX ADMIN — CLINDAMYCIN PHOSPHATE 900 MG: 900 INJECTION, SOLUTION INTRAVENOUS at 14:21

## 2022-12-15 RX ADMIN — PROPOFOL 40 MG: 10 INJECTION, EMULSION INTRAVENOUS at 08:36

## 2022-12-15 RX ADMIN — ATORVASTATIN CALCIUM 20 MG: 20 TABLET, FILM COATED ORAL at 21:54

## 2022-12-15 RX ADMIN — ROCURONIUM BROMIDE 40 MG: 50 INJECTION, SOLUTION INTRAVENOUS at 07:03

## 2022-12-15 RX ADMIN — HYDROMORPHONE HYDROCHLORIDE 0.5 MG: 1 INJECTION, SOLUTION INTRAMUSCULAR; INTRAVENOUS; SUBCUTANEOUS at 07:58

## 2022-12-15 RX ADMIN — FENTANYL CITRATE 50 MCG: 50 INJECTION, SOLUTION INTRAMUSCULAR; INTRAVENOUS at 07:23

## 2022-12-15 RX ADMIN — CEFAZOLIN SODIUM 2 G: 2 INJECTION, SOLUTION INTRAVENOUS at 08:16

## 2022-12-15 RX ADMIN — ONDANSETRON 4 MG: 2 INJECTION INTRAMUSCULAR; INTRAVENOUS at 08:08

## 2022-12-15 RX ADMIN — CELECOXIB 200 MG: 200 CAPSULE ORAL at 05:51

## 2022-12-15 RX ADMIN — BUPIVACAINE HYDROCHLORIDE AND EPINEPHRINE 50 ML: 2.5; 5 INJECTION, SOLUTION EPIDURAL; INFILTRATION; INTRACAUDAL; PERINEURAL at 06:40

## 2022-12-15 RX ADMIN — MUPIROCIN 1 APPLICATION: 20 OINTMENT TOPICAL at 21:54

## 2022-12-15 RX ADMIN — FAMOTIDINE 20 MG: 10 INJECTION INTRAVENOUS at 06:28

## 2022-12-15 RX ADMIN — TRANEXAMIC ACID 1000 MG: 100 INJECTION, SOLUTION INTRAVENOUS at 07:14

## 2022-12-15 NOTE — THERAPY EVALUATION
Patient Name: Mitesh Durham  : 1945    MRN: [id number]                              Today's Date: 12/15/2022       Admit Date: 12/15/2022    Visit Dx: No diagnosis found.  Patient Active Problem List   Diagnosis   • Right calf pain   • Right leg claudication (HCC)   • Femoral artery occlusion, left (HCC)   • Primary osteoarthritis of left hip     Past Medical History:   Diagnosis Date   • CAD (coronary artery disease)     CABG X3   • History of kidney stones    • Hyperlipidemia    • Hypertension    • MI (myocardial infarction) (HCC)    • OA (osteoarthritis) of hip     LEFT HIP PAIN, LIMITED MOBILITY AND SL WEAKNESS     Past Surgical History:   Procedure Laterality Date   • CARDIAC CATHETERIZATION     • CORONARY ARTERY BYPASS GRAFT  1999    X3   • FRACTURE SURGERY Right     NATTY UPPER ARM   • KIDNEY STONE SURGERY Right     UNCLEAR WHAT PROCEDURE PT HAD DONE   • VASCULAR SURGERY Left     LEFT VEIN SURGERY: UNCLEAR      General Information     Row Name 12/15/22 1455          Physical Therapy Time and Intention    Document Type evaluation  -CS     Mode of Treatment individual therapy;physical therapy  -CS     Row Name 12/15/22 1453          General Information    Patient Profile Reviewed yes  -CS     Prior Level of Function independent:;all household mobility;gait;transfer;bed mobility  -CS     Existing Precautions/Restrictions fall;left;hip, posterior  -CS     Barriers to Rehab none identified  -CS     Row Name 12/15/22 1450          Living Environment    People in Home spouse  -CS     Row Name 12/15/22 1458          Home Main Entrance    Number of Stairs, Main Entrance two  -CS     Stair Railings, Main Entrance railings safe and in good condition  -CS     Row Name 12/15/22 145          Stairs Within Home, Primary    Number of Stairs, Within Home, Primary none  -CS     Row Name 12/15/22 1451          Cognition    Orientation Status (Cognition) oriented x 3  -CS     Row Name 12/15/22 1453          Safety  Issues, Functional Mobility    Impairments Affecting Function (Mobility) endurance/activity tolerance;pain;strength  -CS           User Key  (r) = Recorded By, (t) = Taken By, (c) = Cosigned By    Initials Name Provider Type    CS Amy Larson, DARIO Physical Therapist               Mobility     Row Name 12/15/22 1454          Bed Mobility    Bed Mobility supine-sit;sit-supine  -CS     Supine-Sit [place] (Bed Mobility) standby assist  -CS     Sit-Supine [place] (Bed Mobility) not tested  -CS     Assistive Device (Bed Mobility) bed rails;head of bed elevated  -CS     Comment, (Bed Mobility) UI at end of session  -CS     Row Name 12/15/22 8844          Sit-Stand Transfer    Sit-Stand [place] (Transfers) contact guard;verbal cues  -CS     Assistive Device (Sit-Stand Transfers) walker, front-wheeled  -CS     Comment, (Sit-Stand Transfer) cues for hand placement  -     Row Name 12/15/22 0091          Gait/Stairs (Locomotion)    [place] Level (Gait) contact guard;minimum assist (75% patient effort);verbal cues  -CS     Assistive Device (Gait) walker, front-wheeled  -CS     Distance in Feet (Gait) 60'  -CS     Deviations/Abnormal Patterns (Gait) antalgic;iliana decreased;stride length decreased  -CS     Left Sided Gait Deviations weight shift ability decreased;knee buckling, left side  -CS     [place] Level (Stairs) not tested  -CS     Comment, (Gait/Stairs) slow antalgic gait; 2 episodes of L knee buckling but able to recover safely with greater assist  -CS     Row Name 12/15/22 4840          Mobility    Extremity Weight-bearing Status left lower extremity  -CS     Left Lower Extremity (Weight-bearing Status) weight-bearing as tolerated (WBAT)  -CS           User Key  (r) = Recorded By, (t) = Taken By, (c) = Cosigned By    Initials Name Provider Type    Amy Cee PT Physical Therapist               Obj/Interventions     Row Name 12/15/22 4264          Range of Motion Comprehensive     General Range of Motion bilateral lower extremity ROM WFL  -CS     Comment, General Range of Motion L LE limited secondary to post-op; R LE WFL  -CS     Row Name 12/15/22 1450          Strength Comprehensive (MMT)    General Manual Muscle Testing (MMT) Assessment other (see comments)  -CS     Comment, General Manual Muscle Testing (MMT) Assessment L LE limited secondary to post-op; R LE WFL  -CS     Row Name 12/15/22 1459          Motor Skills    Therapeutic Exercise other (see comments)  10 reps L JESSIE protocol - required assist with SLR and hip ABD  -CS     Row Name 12/15/22 1455          Balance    Balance Assessment sitting static balance;sitting dynamic balance;standing static balance;standing dynamic balance  -CS     Static Sitting Balance standby assist  -CS     Dynamic Sitting Balance standby assist  -CS     Position, Sitting Balance unsupported;sitting edge of bed  -CS     Static Standing Balance contact guard  -CS     Dynamic Standing Balance contact guard;minimal assist  -CS     Position/Device Used, Standing Balance supported;walker, front-wheeled  -CS           User Key  (r) = Recorded By, (t) = Taken By, (c) = Cosigned By    Initials Name Provider Type    CS Amy Larson, PT Physical Therapist               Goals/Plan     Row Name 12/15/22 1501          Bed Mobility Goal 1 (PT)    Activity/Assistive Device (Bed Mobility Goal 1, PT) sit to supine;supine to sit  -CS     [place] Level/Cues Needed (Bed Mobility Goal 1, PT) supervision required  -CS     Time Frame (Bed Mobility Goal 1, PT) 1 week  -CS     Row Name 12/15/22 1501          Transfer Goal 1 (PT)    Activity/Assistive Device (Transfer Goal 1, PT) sit-to-stand/stand-to-sit;bed-to-chair/chair-to-bed  -CS     [place] Level/Cues Needed (Transfer Goal 1, PT) standby assist  -CS     Time Frame (Transfer Goal 1, PT) 1 week  -CS     Row Name 12/15/22 1501          Gait Training Goal 1 (PT)    Activity/Assistive Device (Gait Training Goal 1,  PT) gait (walking locomotion);assistive device use;decrease fall risk;diminish gait deviation  -CS     [place] Level (Gait Training Goal 1, PT) contact guard required  -CS     Distance (Gait Training Goal 1, PT) 100'  -CS     Time Frame (Gait Training Goal 1, PT) 1 week  -CS     Row Name 12/15/22 1501          Stairs Goal 1 (PT)    Activity/Assistive Device (Stairs Goal 1, PT) ascending stairs;descending stairs  -CS     [place] Level/Cues Needed (Stairs Goal 1, PT) contact guard required  -CS     Number of Stairs (Stairs Goal 1, PT) 2  -CS     Time Frame (Stairs Goal 1, PT) 1 week  -CS           User Key  (r) = Recorded By, (t) = Taken By, (c) = Cosigned By    Initials Name Provider Type    CS Amy Larson, PT Physical Therapist               Clinical Impression     Row Name 12/15/22 1458          Pain    Pretreatment Pain Rating 0/10 - no pain  -CS     Posttreatment Pain Rating 0/10 - no pain  -CS     Row Name 12/15/22 1455          Plan of Care Review    Plan of Care Reviewed With patient;family  -CS     Outcome Evaluation Pt is a 78 y/o M POD 0 s/p L posterior JESSIE. Pt received in bed upon arrival and agreeable to PT eval. Pt reports he lives with his spouse with 2 PJ and was (I) with mobility prior to admission. PT reviewed posterior hip precautions prior to mobility. Pt completed JESSIE protocol x 10 requiring some assistance secondary to post-op pain and weakness. Pt was able to reach sitting EOB with SBA and bed rails for assistance. Pt then stood and ambulated 60' c RW requiring CGA/min A. Pt presents with a slow antalgic gait and had 2 episodes of L knee buckling but was able to safely recover with greater assistance. Pt returned to sitting UIC at end of session. PT provided education regarding HEP, home safety, precautions, and post-op care. PT recommends home with assist and HHPT. Pt will need a RW and BSC at D/C. Pt will benefit from skilled PT to address strength, endurance, and functional  mobility.  -CS     Row Name 12/15/22 1458          Therapy Assessment/Plan (PT)    Patient/Family Therapy Goals Statement (PT) to return home  -CS     Rehab Potential (PT) good, to achieve stated therapy goals  -CS     Criteria for Skilled Interventions Met (PT) yes;meets criteria  -CS     Therapy Frequency (PT) daily  -CS     Row Name 12/15/22 1458          Positioning and Restraints    Pre-Treatment Position in bed  -CS     Post Treatment Position chair  -CS     In Chair notified nsg;reclined;call light within reach;encouraged to call for assist;exit alarm on;with family/caregiver;heels elevated  -CS           User Key  (r) = Recorded By, (t) = Taken By, (c) = Cosigned By    Initials Name Provider Type    Amy Cee PT Physical Therapist               Outcome Measures     Row Name 12/15/22 1502 12/15/22 1048       How much help from another person do you currently need...    Turning from your back to your side while in flat bed without using bedrails? 4  -CS 3  -EK    Moving from lying on back to sitting on the side of a flat bed without bedrails? 3  -CS 3  -EK    Moving to and from a bed to a chair (including a wheelchair)? 3  -CS 3  -EK    Standing up from a chair using your arms (e.g., wheelchair, bedside chair)? 3  -CS 2  -EK    Climbing 3-5 steps with a railing? 2  -CS 2  -EK    To walk in hospital room? 3  -CS 2  -EK    AM-PAC 6 Clicks Score (PT) 18  -CS 15  -EK    Highest level of mobility 6 --> Walked 10 steps or more  -CS 4 --> Transferred to chair/commode  -EK    Row Name 12/15/22 1502          Functional Assessment    Outcome Measure Options AM-PAC 6 Clicks Basic Mobility (PT)  -CS           User Key  (r) = Recorded By, (t) = Taken By, (c) = Cosigned By    Initials Name Provider Type    Lisa Meza RN Registered Nurse    Amy Cee PT Physical Therapist                             Physical Therapy Education     Title: PT OT SLP Therapies (Done)     Topic: Physical Therapy (Done)      Point: Mobility training (Done)     Learning Progress Summary           Patient Acceptance, E,TB, VU,DU by CS at 12/15/2022 1502   Family Acceptance, E,TB, VU,DU by CS at 12/15/2022 1502                   Point: Home exercise program (Done)     Learning Progress Summary           Patient Acceptance, E,TB, VU,DU by CS at 12/15/2022 1502   Family Acceptance, E,TB, VU,DU by CS at 12/15/2022 1502                   Point: Body mechanics (Done)     Learning Progress Summary           Patient Acceptance, E,TB, VU,DU by CS at 12/15/2022 1502   Family Acceptance, E,TB, VU,DU by CS at 12/15/2022 1502                   Point: Precautions (Done)     Learning Progress Summary           Patient Acceptance, E,TB, VU,DU by  at 12/15/2022 1502   Family Acceptance, E,TB, VU,DU by CS at 12/15/2022 1502                               User Key     Initials Effective Dates Name Provider Type Discipline     09/22/22 -  Amy Larson, PT Physical Therapist PT              PT Recommendation and Plan     Plan of Care Reviewed With: patient, family  Outcome Evaluation: Pt is a 76 y/o M POD 0 s/p L posterior JESSIE. Pt received in bed upon arrival and agreeable to PT eval. Pt reports he lives with his spouse with 2 PJ and was (I) with mobility prior to admission. PT reviewed posterior hip precautions prior to mobility. Pt completed JESSIE protocol x 10 requiring some assistance secondary to post-op pain and weakness. Pt was able to reach sitting EOB with SBA and bed rails for assistance. Pt then stood and ambulated 60' c RW requiring CGA/min A. Pt presents with a slow antalgic gait and had 2 episodes of L knee buckling but was able to safely recover with greater assistance. Pt returned to sitting UIC at end of session. PT provided education regarding HEP, home safety, precautions, and post-op care. PT recommends home with assist and HHPT. Pt will need a RW and BSC at D/C. Pt will benefit from skilled PT to address strength, endurance, and  functional mobility.     Time Calculation:    PT Charges     Row Name 12/15/22 1503             Time Calculation    Start Time 1333  -CS      Stop Time 1409  -CS      Time Calculation (min) 36 min  -CS      PT Received On 12/15/22  -CS      PT - Next Appointment 12/16/22  -CS      PT Goal Re-Cert Due Date 12/22/22  -CS         Time Calculation- PT    Total Timed Code Minutes- PT 34 minute(s)  -CS         Timed Charges    84830 - Gait Training Minutes  14  -CS      12786 - PT Therapeutic Activity Minutes 20  -CS         Total Minutes    Timed Charges Total Minutes 34  -CS       Total Minutes 34  -CS            User Key  (r) = Recorded By, (t) = Taken By, (c) = Cosigned By    Initials Name Provider Type    CS Amy Larson, PT Physical Therapist              Therapy Charges for Today     Code Description Service Date Service Provider Modifiers Qty    18084780826 HC GAIT TRAINING EA 15 MIN 12/15/2022 Amy Larson, PT GP 1    00699947334 HC PT THERAPEUTIC ACT EA 15 MIN 12/15/2022 Amy Larson, PT GP 1    63773215121 HC PT EVAL LOW COMPLEXITY 3 12/15/2022 Amy Larson, PT GP 1          PT G-Codes  Outcome Measure Options: AM-PAC 6 Clicks Basic Mobility (PT)  AM-PAC 6 Clicks Score (PT): 18  PT Discharge Summary  Anticipated Discharge Disposition (PT): home with assist, home with home health    Amy Larson PT  12/15/2022

## 2022-12-15 NOTE — H&P
Orthopaedic Surgery History and Physical    Patient Name:  Mitesh Durham  YOB: 1945  Age: 77 y.o.  Medical Records Number:  [id number]    Date of Admission:  12/15/2022  5:29 AM    Chief Complaint:  No admission diagnoses are documented for this encounter.    Mitesh Durham is a 77 y.o. male who presents c/o severe left hip pain.  The pain has been on and off for many years, worsening recently to the point where the pain is becoming disabling. The pain is a constant dull ache with occasional sharp, stabbing pains.  The patient has failed conservative treatment and would like to proceed with left total hip arthroplasty.    /60 (BP Location: Right arm, Patient Position: Lying)   Pulse 56   Temp 97.7 °F (36.5 °C) (Oral)   Resp 19   SpO2 99%     Past Medical History:    Past Medical History:   Diagnosis Date   • CAD (coronary artery disease)     CABG X3   • History of kidney stones    • Hyperlipidemia    • Hypertension    • MI (myocardial infarction) ([facility]) 1999   • OA (osteoarthritis) of hip     LEFT HIP PAIN, LIMITED MOBILITY AND SL WEAKNESS       Past Surgical History:   Past Surgical History:   Procedure Laterality Date   • CARDIAC CATHETERIZATION     • CORONARY ARTERY BYPASS GRAFT  1999    X3   • FRACTURE SURGERY Right     NATTY UPPER ARM   • KIDNEY STONE SURGERY Right     UNCLEAR WHAT PROCEDURE PT HAD DONE   • VASCULAR SURGERY Left     LEFT VEIN SURGERY: UNCLEAR       Social History:    Social History     Socioeconomic History   • Marital status:    Tobacco Use   • Smoking status: Every Day     Packs/day: 0.50     Years: 10.00     Pack years: 5.00     Types: Cigarettes   • Smokeless tobacco: Never   Vaping Use   • Vaping Use: Never used   Substance and Sexual Activity   • Alcohol use: Never   • Drug use: Never       Family History:    Family History   Problem Relation Age of Onset   • Malig Hyperthermia Neg Hx        Current Medications:  Scheduled Meds:ceFAZolin, 2 g, Intravenous,  Once  Orthopedic surgery custom cocktail, , Injection, Once  sodium chloride, 3 mL, Intravenous, Q12H      Continuous Infusions:lactated ringers, 9 mL/hr, Last Rate: 9 mL/hr (12/15/22 0615)      PRN Meds:.•  fentanyl  •  lidocaine PF 1%  •  midazolam  •  sodium chloride    Current Facility-Administered Medications:   •  ceFAZolin in dextrose (ANCEF) IVPB solution 2 g, 2 g, Intravenous, Once, Israel Don MD, 2 g at 12/15/22 0652  •  fentaNYL citrate (PF) (SUBLIMAZE) injection 50 mcg, 50 mcg, Intravenous, Q10 Min PRN, Gerardo Riddle MD, 50 mcg at 12/15/22 0628  •  lactated ringers infusion, 9 mL/hr, Intravenous, Continuous, Gerardo Riddle MD, Last Rate: 9 mL/hr at 12/15/22 0615, 9 mL/hr at 12/15/22 0615  •  lidocaine PF 1% (XYLOCAINE) injection 0.5 mL, 0.5 mL, Injection, Once PRN, Gerardo Riddle MD  •  midazolam (VERSED) injection 0.5 mg, 0.5 mg, Intravenous, Q10 Min PRN, Gerardo Riddle MD  •  ropivacaine 0.5 % 50 mL, Morphine 5 mg, ketorolac 30 mg, EPINEPHrine 1 mg/mL 0.3 mg in sodium chloride 0.9 % 50 mL solution, , Injection, Once, Israel Don MD  •  sodium chloride 0.9 % flush 3 mL, 3 mL, Intravenous, Q12H, Gerardo Riddle MD  •  sodium chloride 0.9 % flush 3-10 mL, 3-10 mL, Intravenous, PRN, Gerardo Riddle MD    Allergies:  No Known Allergies    Review of Systems:   HEENT: Patient denies any headaches, vision changes, change in hearing, or tinnitus, Patient denies any rhinorrhea,epistaxis, sinus pain, mouth or dental problems, sore throat or hoarseness, or dysphagia  Pulmonary: Patient denies any cough, congestion, SOA, or wheezing  Cardiovascular: Patient denies any chest pain, dyspnea, palpitations, weakness, intolerance of exercise, varicosities, swelling of extremities, known murmur  Gastrointestinal:  Patient denies nausea, vomiting, diarrhea, constipation, loss  of appetite, change in appetite, dysphagia, gas, heartburn, melena, change in bowel habits, use of laxatives or other  drugs to alter the function of the gastrointestinal tract.  Genital/Urinary: Patient denies dysuria, change in color of urine, change in frequency of urination, pain with urgency, incontinence, retention, or nocturia.  Musculoskeletal: Patient denies increased warmth; redness; or swelling of joints; limitation of function; deformity; crepitation: pain in a joint or an extremity, the neck, or the back, especially with movement.  Neurological: Patient denies dizziness, tremor, ataxia, difficulty in speaking, change in speech, paresthesia, loss of sensation, seizures, syncope, changes in memory.  Endocrine system: Patient denies tremors, palpitations, intolerance of heat or cold, polyuria, polydipsia, polyphagia, diaphoresis, exophthalmos, or goiter.  Psychological: Patient denies thoughts/plans or harming self or other; depression,  insomnia, night terrors, prateek, memory loss, disorientation.  Skin: Patient denies any bruising, rashes, discoloration, pruritus, wounds, ulcers, decubiti, changes in the hair or nails  Hematopoietic: Patient denies history of spontaneous or excessive bleeding, epistaxis, hematuria, melena, fatigue, enlarged or tender lymph nodes, pallor, history of anemia.      Physical Exam:  Awake, A&O x3, affect normal, no acute distress  Ambulating with a limp due to hip pain  Hip ROM is limited due to pain  No instability  Strength is 4/5 in the quad, hamstring, abduction and adduction  Cap refill is normal, Sensation intact    Card:  RR, HD Stable  Pulm:  Regular breathing, no S.O.A  Abd:  Soft, NT, ND    Lab Results (last 24 hours)     ** No results found for the last 24 hours. **       [unfilled]    Peripheral Block    Result Date: 12/15/2022  Narrative: Gerardo iRddle MD     12/15/2022  6:46 AM Peripheral Block Patient reassessed immediately prior to procedure Patient location during procedure: pre-op Start time: 12/15/2022 6:30 AM Stop time: 12/15/2022 6:40 AM Reason for block: at surgeon's  request and post-op pain management Performed by Anesthesiologist: Gerardo Riddle MD Preanesthetic Checklist Completed: patient identified, IV checked, site marked, risks and benefits discussed, surgical consent, monitors and equipment checked, pre-op evaluation and timeout performed Prep: Pt Position: supine Sterile barriers:gloves and cap Prep: ChloraPrep Patient monitoring: blood pressure monitoring, continuous pulse oximetry and EKG Procedure Sedation: yes Guidance:ultrasound guided ULTRASOUND INTERPRETATION.  Using ultrasound guidance a 21 G gauge needle was placed in close proximity to the nerve, at which point, under ultrasound guidance anesthetic was injected in the area of the nerve and spread of the anesthesia was seen on ultrasound in close proximity thereto.  There were no abnormalities seen on ultrasound; a digital image was taken; and the patient tolerated the procedure with no complications. Images:still images obtained, printed/placed on chart Laterality:left Block Type:fascia iliaca compartment Injection Technique:single-shot Needle Type:echogenic Needle Gauge:21 G Resistance on Injection: none Medications Used: bupivacaine-EPINEPHrine PF (MARCAINE w/EPI) 0.25% -[id number] injection - Injection  50 mL - 12/15/2022 6:40:00 AM Post Assessment Injection Assessment: negative aspiration for heme, no paresthesia on injection and incremental injection Patient Tolerance:comfortable throughout block Complications:no Additional Notes Ultrasound guidance was used to view and verify needle placement and medication disbursement.    XR Chest PA & Lateral, XR Hip With or Without Pelvis 2 - 3 View Left    Result Date: 12/7/2022  Narrative: TWO-VIEW LEFT HIP AND ONE VIEW AP PELVIS  HISTORY: Left hip pain. Preop for hip surgery.  FINDINGS: There are extremely severe degenerative changes on the left hip more than right with very marked joint space narrowing and subchondral degenerative cystic change and sclerosis as  well as marginal spurring.  TWO-VIEW CHEST  HISTORY: Preop for hip surgery. Hypertension.  FINDINGS: The lungs are well-expanded and clear. The heart and hilar structures are within normal limits with sternal wires from previous cardiac surgery. There is no acute disease or change from 01/16/2017.  This report was finalized on 12/7/2022 4:37 PM by Dr. Denlison Gaspar M.D.          Assessment:  End-stage Primary Left Hip Osteoarthritis    Plan:  Patient's pain is becoming disabling, despite extensive conservative treatment.  Radiographs reveal end-stage degenerative changes.  The risks of surgery, including, but not limited to, heart attack, stroke, dying, DVT, leg length inequality, nerve injury, vascular injury, stiffness and infection were discussed.  The alternatives and benefits were also discussed.  All questions answered and the patient wishes to proceed with left total hip arthroplasty.    Israel Ty PA-C  [facility]  [address] [place] [address]  [phone]    12/15/2022    CC: Colten Lema MD, Israel Don MD

## 2022-12-15 NOTE — ANESTHESIA PROCEDURE NOTES
Airway  Urgency: elective    Date/Time: 12/15/2022 7:07 AM  Airway not difficult    General Information and Staff    Patient location during procedure: OR  Anesthesiologist: Gerardo Riddle MD  CRNA/CAA: Gaby Vasquez CRNA    Indications and Patient Condition  Indications for airway management: airway protection    Preoxygenated: yes  Mask difficulty assessment: 2 - vent by mask + OA or adjuvant +/- NMBA    Final Airway Details  Final airway type: endotracheal airway      Successful airway: ETT  Cuffed: yes   Successful intubation technique: direct laryngoscopy  Facilitating devices/methods: intubating stylet  Endotracheal tube insertion site: oral  Blade: Seymour  Blade size: 2  ETT size (mm): 7.5  Cormack-Lehane Classification: grade I - full view of glottis  Placement verified by: chest auscultation and capnometry   Measured from: lips  ETT/EBT  to lips (cm): 23  Number of attempts at approach: 1  Assessment: lips, teeth, and gum same as pre-op and atraumatic intubation

## 2022-12-15 NOTE — DISCHARGE PLACEMENT REQUEST
"Juanito Durham (77 y.o. Male)     Date of Birth   1945    Social Security Number       Address   [address] [address]    Home Phone   [phone]    MRN   [id number]       Caodaism   Unknown    Marital Status                               Admission Date   12/15/22    Admission Type   Elective    Admitting Provider   Israel Don MD    Attending Provider   Israel Don MD    Department, Room/Bed   [address], P792/1       Discharge Date       Discharge Disposition   Home or Self Care    Discharge Destination                               Attending Provider: Israel Don MD    Allergies: No Known Allergies    Isolation: None   Infection: None   Code Status: CPR    Ht: 170.2 cm (67.01\")   Wt: 81.6 kg (179 lb 14.3 oz)    Admission Cmt: None   Principal Problem: Primary osteoarthritis of left hip [M16.12]                 Active Insurance as of 12/15/2022     Primary Coverage     Payor Plan Insurance Group Employer/Plan Group    ANTHEM MEDICARE REPLACEMENT ANTHEM MEDICARE ADVANTAGE [id number]     Payor Plan Address Payor Plan Phone Number Payor Plan Fax Number Effective Dates    [address] [phone]  1/1/2022 - None Entered    [facility] [id number]       Subscriber Name Subscriber Birth Date Member ID       JUANITO DURHAM 1945 [id number]                 Emergency Contacts      (Rel.) Home Phone Work Phone Mobile Phone    giovanny durham (Spouse) -- -- --    Juanito Durham (Son) -- -- [phone]    Kade Druham (Daughter) -- -- [phone]            "

## 2022-12-15 NOTE — PLAN OF CARE
Goal Outcome Evaluation:  Plan of Care Reviewed With: patient, family           Outcome Evaluation: Pt is a 78 y/o M POD 0 s/p L posterior JESSIE. Pt received in bed upon arrival and agreeable to PT eval. Pt reports he lives with his spouse with 2 PJ and was (I) with mobility prior to admission. PT reviewed posterior hip precautions prior to mobility. Pt completed JESSIE protocol x 10 requiring some assistance secondary to post-op pain and weakness. Pt was able to reach sitting EOB with SBA and bed rails for assistance. Pt then stood and ambulated 60' c RW requiring CGA/min A. Pt presents with a slow antalgic gait and had 2 episodes of L knee buckling but was able to safely recover with greater assistance. Pt returned to sitting UIC at end of session. PT provided education regarding HEP, home safety, precautions, and post-op care. PT recommends home with assist and HHPT. Pt will need a RW and BSC at D/C. Pt will benefit from skilled PT to address strength, endurance, and functional mobility.

## 2022-12-15 NOTE — DISCHARGE SUMMARY
"  Orthopaedic Surgery Discharge Summary    Patient Name:  Mitesh Durham  YOB: 1945  Age: 77 y.o.  Medical Records Number:  [id number]    Date of Admission:  12/15/2022  Date of Discharge:  12/16/2022    Primary Discharge Diagnosis:  Primary osteoarthritis of left hip [M16.12]    Secondary Discharge Diagnosis:    Problems Addressed this Visit        Musculoskeletal and Injuries    * (Principal) Primary osteoarthritis of left hip - Primary    Relevant Medications    oxyCODONE-acetaminophen (PERCOCET) 5-325 MG per tablet   Diagnoses       Codes Comments    Primary osteoarthritis of left hip    -  Primary ICD-10-CM: M16.12  ICD-9-CM: 715.15           Procedures Performed:  Left Total Hip Arthroplasty      Hospital Course:    Mitesh Durham is a 77 y.o.  male who underwent successful left fabiola on 12/15/2022.  Mitesh Durham was started on Plavix 75 mg daily and aspirin 81 mg daily post-operatively for DVT prophylaxis.  On post-op day 1 the patients dressing was changed and their incision was clean, with no signs of infection and their calf was soft, with no signs of DVT.  The patient progressed well with physical therapy and the patients hemoglobin remained stable. On post-operative day 1 the patient was felt ready for discharge.     Vitals:  Vitals:    12/15/22 1015 12/15/22 1027 12/15/22 1152 12/15/22 1348   BP: 106/50 121/57 93/46 119/61   BP Location:  Left arm Right arm Left arm   Patient Position:  Lying Lying    Pulse: 63 62 58 61   Resp: 16 16 16 18   Temp:  97.6 °F (36.4 °C)  97.1 °F (36.2 °C)   TempSrc:  Oral  Oral   SpO2: 93% 92% 98% 100%   Weight:  81.6 kg (179 lb 14.3 oz)     Height:  170.2 cm (67.01\")         Discharge Medications:      Discharge Medications      New Medications      Instructions Start Date   aspirin 81 MG EC tablet   81 mg, Oral, Daily   Start Date: December 16, 2022     docusate sodium 250 MG capsule  Commonly known as: COLACE   250 mg, Oral, 2 Times Daily PRN    "   oxyCODONE-acetaminophen 5-325 MG per tablet  Commonly known as: PERCOCET   1-2 tablets, Oral, Every 4 Hours PRN         Continue These Medications      Instructions Start Date   amLODIPine 5 MG tablet  Commonly known as: NORVASC   TAKE ONE TABLET BY MOUTH EVERY DAY FOR BLOOD PRESSURE      atorvastatin 20 MG tablet  Commonly known as: LIPITOR   20 mg, Oral, Nightly      clopidogrel 75 MG tablet  Commonly known as: PLAVIX   75 mg, Oral, Daily, HOLDING FOR SURGERY: PT STATES INSTRUCTED PER DR. KELLY TO HOLD FOR ONE WEEK PRIOR TO SURGERY, PT STATES HE DECIDED TO STOP IT FOR 2 WEEKS      LISINOPRIL PO   20 mg, Oral, Daily      temazepam 30 MG capsule  Commonly known as: RESTORIL   TAKE ONE CAPSULE BY MOUTH EVERY DAY AT BEDTIME AS NEEDED      traMADol 50 MG tablet  Commonly known as: ULTRAM   50 mg, Oral, Nightly      TRAZODONE HCL PO   50 mg, Oral, Nightly         Stop These Medications    Chlorhexidine Gluconate 2 % pads     HYDROcodone-acetaminophen 5-325 MG per tablet  Commonly known as: NORCO            Pain Medications:  Percocet 5/325 mg 1-2 po q 4-6 hours prn pain    DVT Prophylaxis:  Plavix 75 mg po daily and Aspirin 81 mg po daily, on chronically      Total Hip Joint Replacement Discharge Instructions:    I. ACTIVITIES:  1. Exercises:  ? Complete exercise program as taught by the hospital physical therapist 2 times per day  ? Exercise program will be advanced by the physical therapist  ? During the day be up ambulating every 2 hours (while awake) for short distances  ? Complete the ankle pump exercises at least 10 times per hour (while awake)  ? Elevate legs most of the day the first week post operatively and thereafter elevate legs when in bed and for at least 30 minutes during the day. Caution must be taken to avoid pillow placement under the bend of the knee as this can led to flexion contractures of the knee.  ? Use cold packs 20-30 minutes approximately 5 times per day. This should be done before and  after completing your exercises and at any time you are experiencing pain/ stiffness in your operative extremity.      2. Activities of Daily Living:  ? No tub baths, hot tubs, or swimming pools for 4 weeks  ? May shower and let water run over the incision on post-operative day #5 if no drainage. Do not scrub or rub the incision. Simply let the water run over the incision and pat dry.    II. Restrictions  ? Do not cross legs or kneel  ? Your surgeon will discuss with you when you will be able to drive again.  ? Weight bearing is as tolerated  ? First week stay inside on even terrain. May go up and down stairs one stair at a time utilizing the hand rail  ? After one week, you may venture outside.    III. Precautions:  ? Everyone that comes near you should wash their hands  ? No elective dental, genital-urinary, or colon procedures or surgical procedures for 12 weeks after surgery unless absolutely necessary.  ?  If dental work or surgical procedure is deemed absolutely necessary, you will need to contact your surgeon as you will need to take antibiotics 1 hour prior to any dental work (including teeth cleanings).  ? Please discuss with your surgeon prophylactic antibiotics as the length of time this intervention will be necessary for you varies with each patient’s health history and situation.  ? Avoid sick people. If you must be around someone who is ill, they should wear a mask.  ? Avoid visits to the Emergency Room or Urgent Care unless you are having a life threatening event.   ? If ordered stockings are to be placed on in the morning and removed at night. Monitor the stockings to ensure that any swelling is not causing the stockings to become too tight. In this case, remove stockings immediately.    IV. INCISION CARE:  ? Wash your hands prior to dressing changes  ? Change the dressing as needed to keep incision clean and dry. Utilize dry gauze and paper tape. Avoid touching the side of the gauze that goes  against the incision with your hands.  ? No creams or ointments to the incision  ? May remove dressing once the incision is free of drainage  ? Do not touch or pick at the incision  ? Check incision every day and notify surgeon immediately if any of the following signs or symptoms are noted:  o Increase in redness  o Increase in swelling around the incision and of the entire extremity  o Increase in pain  o Drainage oozing from the incision  o Pulling apart of the edges of the incision  o Increase in overall body temperature (greater than 100.5 degrees)  ? Your surgeon will instruct you regarding suture or staple removal    V. Medications:   1. Anticoagulants: You will be discharged on an anticoagulant. This is a prophylactic medication that helps prevent blood clots during your post-operative period. The type and length of dosage varies based on your individual needs, procedure performed, and surgeon’s preference.  ? While taking the anticoagulant, you should avoid taking any additional aspirin, ibuprofen (Advil or Motrin), Aleve (Naprosyn) or other non-steroidal anti-inflammatory medications.   ? Notify surgeon immediately if any christine bleeding is noted in the urine, stool, emesis, or from the nose or the incision. Blood in the stool will often appear as black rather than red. Blood in urine may appear as pink. Blood in emesis may appear as brown/black like coffee grounds.  ? You will need to apply pressure for longer periods of time to any cuts or abrasions to stop bleeding  ? Avoid alcohol while taking anticoagulants    2. Stool Softeners: You will be at greater risk of constipation after surgery due to being less mobile and the pain medications.   ? Take stool softeners as instructed by your surgeon while on pain medications. Over the counter Colace 100 mg 1-2 capsules twice daily.   ? If stools become too loose or too frequent, please decreases the dosage or stop the stool softener.  ? If constipation occurs  despite use of stool softeners, you are to continue the stool softeners and add a laxative (Milk of Magnesia 1 ounce daily as needed)  ? Drink plenty of fluids, and eat fruits and vegetables during your recovery time    3. Pain Medications utilized after surgery are narcotics and the law requires that the following information be given to all patients that are prescribed narcotics:  ? CLASSIFICATION: Pain medications are called Opioids and are narcotics  ? LEGALITIES: It is illegal to share narcotics with others and to drive within 24 hours of taking narcotics  ? POTENTIAL SIDE EFFECTS: Potential side effects of opioids include: nausea, vomiting, itching, dizziness, drowsiness, dry mouth, constipation, and difficulty urinating.  ? POTENTIAL ADVERSE EFFECTS:   o Opioid tolerance can develop with use of pain medications and this simply means that it requires more and more of the medication to control pain; however, this is seen more in patients that use opioids for longer periods of time.  o Opioid dependence can develop with use of Opioids and this simply means that to stop the medication can cause withdrawal symptoms; however, this is seen with patients that use Opioids for longer periods of time.  o Opioid addiction can develop with use of Opioids and the incidence of this is very unlikely in patients who take the medications as ordered and stop the medications as instructed.  o Opioid overdose can be dangerous, but is unlikely when the medication is taken as ordered and stopped when ordered. It is important not to mix opioids with alcohol or with and type of sedative such as Benadryl as this can lead to over sedation and respiratory difficulty.  ? DOSAGE:   o Pain medications will need to be taken consistently for the first week to decrease pain and promote adequate pain relief and participation in physical therapy.  o After the initial surgical pain begins to resolve, you may begin to decrease the pain  medication. By the end of 6-8 weeks, you should be off of pain medications.  o Refills will not be given by the office during evening hours, on weekends, or after 6-8 weeks post-op.  o To seek refills on pain medications during the initial 6 week post-operative period, you must call the office 48 hours in advance to request the refill. The office will then notify you when to  the prescription. DO NOT wait until you are out of the medication to request a refill.    V. FOLLOW-UP VISITS:  ? You will need to follow up in the office with your surgeon in 3 weeks. Please call this number [phone] to schedule this appointment.  If you have any concerns or suspected complications prior to your follow up visit, please call your surgeons office. Do not wait until your appointment time if you suspect complications. These will need to be addressed in the office promptly.    Israel Ty PA-C  [facility]  [address] [place] [address]  [phone]    12/15/2022    CC:Colten Lema MD:Israel Don MD

## 2022-12-15 NOTE — ANESTHESIA PREPROCEDURE EVALUATION
Anesthesia Evaluation     Patient summary reviewed and Nursing notes reviewed   NPO Solid Status: > 8 hours  NPO Liquid Status: > 2 hours           Airway   Mallampati: II  TM distance: >3 FB  Neck ROM: full  Comment: Blade: Mac 4; Grade View: 1  Dental - normal exam   (+) poor dentition        Pulmonary - normal exam    breath sounds clear to auscultation  (+) a smoker Smoked day of surgery,   Cardiovascular - normal exam    ECG reviewed  Rhythm: regular  Rate: normal    (+) hypertension, past MI  >12 months, CAD, CABG >6 Months, dysrhythmias PVC, PVD, hyperlipidemia,   (-) angina, orthopnea, PND, SAGASTUME    ROS comment: Sinus rhythm  Multiple ventricular premature complexes  Abnormal R-wave progression, early transition  No Prior Tracing for Comparison    Neuro/Psych- negative ROS  GI/Hepatic/Renal/Endo    (+)   renal disease stones,     Musculoskeletal     Abdominal    Substance History - negative use     OB/GYN negative ob/gyn ROS         Other   arthritis,                    Anesthesia Plan    ASA 3     general     (FIC block for POPC)  intravenous induction     Anesthetic plan, risks, benefits, and alternatives have been provided, discussed and informed consent has been obtained with: patient.        CODE STATUS:

## 2022-12-15 NOTE — ANESTHESIA POSTPROCEDURE EVALUATION
Patient: Mitesh Durham    Procedure Summary     Date: 12/15/22 Room / Location:  [facility] [address] [place] [address] [facility]    Anesthesia Start: 0657 Anesthesia Stop: 0853    Procedure: LEFT TOTAL HIP ARTHROPLASTY- POSTERIOR (Left: Hip) Diagnosis:     Surgeons: Israel Don MD Provider: Gerardo Riddle MD    Anesthesia Type: general ASA Status: 3          Anesthesia Type: general    Vitals  Vitals Value Taken Time   /54 12/15/22 0945   Temp 36.4 °C (97.5 °F) 12/15/22 0852   Pulse 63 12/15/22 0948   Resp 16 12/15/22 0930   SpO2 97 % 12/15/22 0948   Vitals shown include unvalidated device data.        Post Anesthesia Care and Evaluation    Patient location during evaluation: PACU  Patient participation: complete - patient participated  Level of consciousness: awake  Pain management: adequate    Airway patency: patent  Anesthetic complications: No anesthetic complications    Cardiovascular status: acceptable  Respiratory status: acceptable  Hydration status: acceptable    Comments: /49   Pulse 64   Temp 36.4 °C (97.5 °F) (Oral)   Resp 16   SpO2 94%

## 2022-12-15 NOTE — OP NOTE
Orthopaedic Surgery Operative Note    Patient Name:  Mitesh Durham  YOB: 1945  Age: 77 y.o.  Medical Records Number:  [id number]    Date of Procedure:  12/15/2022    Pre-operative Diagnosis:  Primary Osteoarthritis Left Hip    Post-operative Diagnosis:  Primary Osteoarthritis Left Hip    Procedure Performed:  Left Total Hip Arthroplasty                                          Layered Closure    Implants:  Biomet 56 mm G7 Acetabular Shell, 13 mm Standard Offset Taperloc Complete Stem, 40 mm High-wall Polyethylene liner, Standard 40 mm Ceramic Head    Surgeon:  Israel Don M.D.    Assistant: John Torres (who was present during the critical portions of the case, thereby decreasing operative time and patient morbidity)    Anesthetic Type:  General    Estimated Blood Loss:  250cc's    Specimens: * No orders in the log *    No Complications      Indications for Procedure:  Mitesh Durham is a 77 y.o. male suffering from end stage degenerative changes in the left hip.  The patients pain is becoming disabling, despite extensive conservative care, including NSAIDS, activity modification and therapy. The risks, benefits and alternatives were discussed and the patient wishes to proceed with left total hip arthroplasty.      Procedure Performed:    After informed consent was obtained, the correct patient identified, the correct operative side marked by the operative surgeon, and pre-operative IV antibiotics given, the patient was taken to the operating room and placed supine on the operating table.  After general anesthesia was induced, a surgical time out was performed and 1 gm of IV Tranxemic Acid was given, the patient was placed in the right lateral decubitus position and the left lower extremity was prepped with chloraprep and draped in a sterile fashion.    An incision was made overlying the left hip.  We sharply dissected down to expose the fascia over the gluteus jayla and the Iliotibial  band.  We split the fascia in line with the skin incision and placed a Charnley retractor carefully to avoid damage to the Sciatic Nerve.  We then began our posterior approach to the hip by identifying the short external rotators and tagging these with a #5 Tevdek and releasing them from their insertion on the femur.  We then identified the posterior capsule, released the capsule from it's insertion on the femur and tagged the capsule proximally and distally with a #5 Tevdek.  We then dislocated the hip and made our neck cut roughly 2-3 mm proximal to the lesser trochanter. We measured the head to be 53 mm and our neck cut to be 60 mm.      We then gained exposure of the acetabulum, removed the pulvinar and labrum, then sequentially  reamed from a 36 mm reamer up to a 55 mm reamer.  After reaming, we had excellent interference fit and a nice bleeding, bony bed for our acetabular component.  We copiously irrigated the acetabulum, then impacted our permanent acetabular component into place We assured we were completely seated by checking through the apical hole, we placed two 6.5 mm cancellous screws and then we placed our permanent liner.    We then turned our attention to the femur where we cleared the trochanteric fossa of soft tissue.  We entered the canal with a box chisel, hand held reamer and lateralizing reamer.  We then sequentially broached from a 4 mm broach up to a 13 mm broach.  We trialed with both a standard neck and high offset neck with the std 40 mm head and had excellent stability, range of motion and leg length equality with the std offset neck.  An intraoperative radiograph revealed excellent implant position and alignment.  We removed our trials and copiously irrigated the hip.  We then placed our permanent 13 std offset stem and trialed again with a -3, std and +3 40 mm heads.  The std 40 mm head gave us the best range of motion, stability and leg length equality.  We removed the trials,  copiously irrigated the hip with dilute betadine solution, cleaned and dried the trunion and then we impacted our permanent head.  We then reduced the hip and began our layered closure after placing our local anesthetic and re-dosing IV antibiotics.  We closed the short external rotators and posterior capsule through two drill holes in the greater trochanter and a soft tissue stitch in the Gluteus Medius.  We closed the deep fascia with a #1 Vicryl, the deep subcutaneous tissue with a #1 Vicryl, the subcutaneous tissue with 2-0 Vicryl and the skin with 3-0 Monocryl and a Zip-leticia.  We placed a sterile dressing of Xeroform and an Island dressing.  All sponge and needle counts were correct.  The patient was awakened from general anesthesia and taken to the recovery room in stable condition.    The patient will be started on Aspirin 325 mg mg twice daily for DVT prophylaxis.  IV antibiotics will be discontinued within 24 hours of surgery.  Immediately prior to surgery, there were no acute Thromboembolic nor Cardiovascular risk factors.  An updated Medical Reconciliation form is on the chart.    Israel Ty PA-C  [facility]  [address] [place], [place] [id number]  [phone]    12/15/2022    CC: @PCP, Israel Don MD

## 2022-12-15 NOTE — DISCHARGE SUMMARY
"  Orthopaedic Surgery Discharge Summary    Patient Name:  Mitesh Durham  YOB: 1945  Age: 77 y.o.  Medical Records Number:  [id number]    Date of Admission:  12/15/2022  Date of Discharge:  12/16/2022    Primary Discharge Diagnosis:  Primary osteoarthritis of left hip [M16.12]    Secondary Discharge Diagnosis:    Problems Addressed this Visit        Musculoskeletal and Injuries    * (Principal) Primary osteoarthritis of left hip - Primary    Relevant Medications    oxyCODONE-acetaminophen (PERCOCET) 5-325 MG per tablet   Diagnoses       Codes Comments    Primary osteoarthritis of left hip    -  Primary ICD-10-CM: M16.12  ICD-9-CM: 715.15           Procedures Performed:  Left Total Hip Arthroplasty      Hospital Course:    Mitesh Durham is a 77 y.o.  male who underwent successful left fabiola on 12/15/2022.  Mitesh Druham was started on Plavix 75 mg daily and aspirin 81 mg daily post-operatively for DVT prophylaxis.  On post-op day 1 the patients dressing was changed and their incision was clean, with no signs of infection and their calf was soft, with no signs of DVT.  The patient progressed well with physical therapy and the patients hemoglobin remained stable. On post-operative day 1 the patient was felt ready for discharge.     Vitals:  Vitals:    12/15/22 1015 12/15/22 1027 12/15/22 1152 12/15/22 1348   BP: 106/50 121/57 93/46 119/61   BP Location:  Left arm Right arm Left arm   Patient Position:  Lying Lying    Pulse: 63 62 58 61   Resp: 16 16 16 18   Temp:  97.6 °F (36.4 °C)  97.1 °F (36.2 °C)   TempSrc:  Oral  Oral   SpO2: 93% 92% 98% 100%   Weight:  81.6 kg (179 lb 14.3 oz)     Height:  170.2 cm (67.01\")         Discharge Medications:      Discharge Medications      New Medications      Instructions Start Date   aspirin 81 MG EC tablet   81 mg, Oral, Daily   Start Date: December 16, 2022     docusate sodium 250 MG capsule  Commonly known as: COLACE   250 mg, Oral, 2 Times Daily PRN    "   oxyCODONE-acetaminophen 5-325 MG per tablet  Commonly known as: PERCOCET   1-2 tablets, Oral, Every 4 Hours PRN         Continue These Medications      Instructions Start Date   amLODIPine 5 MG tablet  Commonly known as: NORVASC   TAKE ONE TABLET BY MOUTH EVERY DAY FOR BLOOD PRESSURE      atorvastatin 20 MG tablet  Commonly known as: LIPITOR   20 mg, Oral, Nightly      Chlorhexidine Gluconate 2 % pads   Apply externally, AS DIRECTED PAT      clopidogrel 75 MG tablet  Commonly known as: PLAVIX   75 mg, Oral, Daily, HOLDING FOR SURGERY: PT STATES INSTRUCTED PER DR. KELLY TO HOLD FOR ONE WEEK PRIOR TO SURGERY, PT STATES HE DECIDED TO STOP IT FOR 2 WEEKS      HYDROcodone-acetaminophen 5-325 MG per tablet  Commonly known as: NORCO   1 tablet, Oral, Every 6 Hours PRN, for pain      LISINOPRIL PO   20 mg, Oral, Daily      temazepam 30 MG capsule  Commonly known as: RESTORIL   TAKE ONE CAPSULE BY MOUTH EVERY DAY AT BEDTIME AS NEEDED      traMADol 50 MG tablet  Commonly known as: ULTRAM   50 mg, Oral, Nightly      TRAZODONE HCL PO   50 mg, Oral, Nightly             Pain Medications:  Percocet 5/325 mg 1-2 po q 4-6 hours prn pain    DVT Prophylaxis:  Plavix 75 mg po daily and Aspirin 81 mg po daily, on chronically      Total Hip Replacement Discharge Instructions:    I. ACTIVITIES:  1. Exercises:  ? Complete exercise program as taught by the hospital physical therapist 2 times per day  ? Exercise program will be advanced by the physical therapist  ? During the day be up ambulating every 2 hours (while awake) for short distances  ? Complete the ankle pump exercises at least 10 times per hour (while awake)  ? Elevate legs when in bed and for at least 30 minutes during the day.Use cold packs 20-30 minutes approximately 5 times per day. This should be done before and after completing your exercises and at any time you are experiencing pain/ stiffness in your operative extremity.      2. Activities of Daily Living:  ? No tub  baths, hot tubs, or swimming pools for 4 weeks  ? May shower and let water run over the incision on post-operative day #5 if no drainage. Do not scrub or rub the incision. Simply let the water run over the incision and pat dry.    II. Restrictions  ? Continue hip precautions as taught at the hospital  ? Your surgeon will discuss with you when you will be able to drive again.  ? Weight bearing is as tolerated  ? First week stay inside on even terrain. May go up and down stairs one stair at a time utilizing the hand rail once cleared by physical therapy to do so.  ? After one week, you may venture outside (if cleared to do so by physical therapist).    III. Precautions:  ? Everyone that comes near you should wash their hands  ? No elective dental, genital-urinary, or colon procedures or surgical procedures for 12 weeks after surgery unless absolutely necessary.  ?  If dental work or surgical procedure is deemed absolutely necessary, you will need to contact your surgeon as you will need to take antibiotics 1 hour prior to any dental work (including teeth cleanings).  ? Please discuss with your surgeon prophylactic antibiotics as the length of time this intervention will be necessary for you varies with each patient’s health history and situation.  ? Avoid sick people. If you must be around someone who is ill, they should wear a mask.  ? Avoid visits to the Emergency Room or Urgent Care unless you are having a life threatening event.   ? If ordered stockings are to be placed on in the morning and removed at night. Monitor the stockings to ensure that any swelling is not causing the stockings to become too tight. In this case, remove stockings immediately.    IV. INCISION CARE:  ? Wash your hands prior to dressing changes  ? Change the dressing as needed to keep incision clean and dry. Utilize dry gauze and paper tape. Avoid touching the side of the gauze that goes against the incision with your hands.  ? No creams or  ointments to the incision  ? May remove dressing once the incision is free of drainage  ? Do not touch or pick at the incision  ? Check incision every day and notify surgeon immediately if any of the following signs or symptoms are noted:  o Increase in redness  o Increase in swelling around the incision and of the entire extremity  o Increase in pain  o Drainage oozing from the incision  o Pulling apart of the edges of the incision  o Increase in overall body temperature (greater than 100.5 degrees)  ? Your surgeon will instruct you regarding suture or staple removal    V. Medications:   1. Anticoagulants: You will be discharged on an anticoagulant. This is a prophylactic medication that helps prevent blood clots during your post-operative period. The type and length of dosage varies based on your individual needs, procedure performed, and surgeon’s preference.  ? While taking the anticoagulant, you should avoid taking any additional aspirin, ibuprofen (Advil or Motrin), Aleve (Naprosyn) or other non-steroidal anti-inflammatory medications.   ? Notify surgeon immediately if any christine bleeding is noted in the urine, stool, emesis, or from the nose or the incision. Blood in the stool will often appear as black rather than red. Blood in urine may appear as pink. Blood in emesis may appear as brown/black like coffee grounds.  ? You will need to apply pressure for longer periods of time to any cuts or abrasions to stop bleeding  ? Avoid alcohol while taking anticoagulants    2. Stool Softeners: You will be at greater risk of constipation after surgery due to being less mobile and the pain medications.   ? Take stool softeners as instructed by your surgeon while on pain medications. Over the counter Colace 100 mg 1-2 capsules twice daily.   ? If stools become too loose or too frequent, please decreases the dosage or stop the stool softener.  ? If constipation occurs despite use of stool softeners, you are to continue the  stool softeners and add a laxative (Milk of Magnesia 1 ounce daily as needed)  ? Drink plenty of fluids, and eat fruits and vegetables during your recovery time    3. Pain Medications utilized after surgery are narcotics and the law requires that the following information be given to all patients that are prescribed narcotics:  ? CLASSIFICATION: Pain medications are called Opioids and are narcotics  ? LEGALITIES: It is illegal to share narcotics with others and to drive within 24 hours of taking narcotics  ? POTENTIAL SIDE EFFECTS: Potential side effects of opioids include: nausea, vomiting, itching, dizziness, drowsiness, dry mouth, constipation, and difficulty urinating.  ? POTENTIAL ADVERSE EFFECTS:   o Opioid tolerance can develop with use of pain medications and this simply means that it requires more and more of the medication to control pain; however, this is seen more in patients that use opioids for longer periods of time.  o Opioid dependence can develop with use of Opioids and this simply means that to stop the medication can cause withdrawal symptoms; however, this is seen with patients that use Opioids for longer periods of time.  o Opioid addiction can develop with use of Opioids and the incidence of this is very unlikely in patients who take the medications as ordered and stop the medications as instructed.  o Opioid overdose can be dangerous, but is unlikely when the medication is taken as ordered and stopped when ordered. It is important not to mix opioids with alcohol or with and type of sedative such as Benadryl as this can lead to over sedation and respiratory difficulty.  ? DOSAGE:   o Pain medications will need to be taken consistently for the first week to decrease pain and promote adequate pain relief and participation in physical therapy.  o After the initial surgical pain begins to resolve, you may begin to decrease the pain medication. By the end of 6-8 weeks, you should be off of pain  medications.  o Refills will not be given by the office during evening hours, on weekends, or after 6-8 weeks post-op.  o To seek refills on pain medications during the initial 6 week post-operative period, you must call the office 48 hours in advance to request the refill. The office will then notify you when to  the prescription. DO NOT wait until you are out of the medication to request a refill.    V. FOLLOW-UP VISITS:  ? You will need to follow up in the office with your surgeon in 3 weeks. Please call this number [phone]  to schedule this appointment.  If you have any concerns or suspected complications prior to your follow up visit, please call your surgeons office. Do not wait until your appointment time if you suspect complications. These will need to be addressed in the office promptly.      Israel Ty PA-C  [facility]  [address] [place] [address]  [phone]     12/15/2022    CC:Colten Lema MD:Israel Don MD

## 2022-12-15 NOTE — ANESTHESIA PROCEDURE NOTES
Peripheral Block      Patient reassessed immediately prior to procedure    Patient location during procedure: pre-op  Start time: 12/15/2022 6:30 AM  Stop time: 12/15/2022 6:40 AM  Reason for block: at surgeon's request and post-op pain management  Performed by  Anesthesiologist: Gerardo Riddle MD  Preanesthetic Checklist  Completed: patient identified, IV checked, site marked, risks and benefits discussed, surgical consent, monitors and equipment checked, pre-op evaluation and timeout performed  Prep:  Pt Position: supine  Sterile barriers:gloves and cap  Prep: ChloraPrep  Patient monitoring: blood pressure monitoring, continuous pulse oximetry and EKG  Procedure    Sedation: yes    Guidance:ultrasound guided    ULTRASOUND INTERPRETATION.  Using ultrasound guidance a 21 G gauge needle was placed in close proximity to the nerve, at which point, under ultrasound guidance anesthetic was injected in the area of the nerve and spread of the anesthesia was seen on ultrasound in close proximity thereto.  There were no abnormalities seen on ultrasound; a digital image was taken; and the patient tolerated the procedure with no complications. Images:still images obtained, printed/placed on chart    Laterality:left  Block Type:fascia iliaca compartment  Injection Technique:single-shot  Needle Type:echogenic  Needle Gauge:21 G  Resistance on Injection: none    Medications Used: bupivacaine-EPINEPHrine PF (MARCAINE w/EPI) 0.25% -[id number] injection - Injection   50 mL - 12/15/2022 6:40:00 AM      Post Assessment  Injection Assessment: negative aspiration for heme, no paresthesia on injection and incremental injection  Patient Tolerance:comfortable throughout block  Complications:no  Additional Notes  Ultrasound guidance was used to view and verify needle placement and medication disbursement.

## 2022-12-16 ENCOUNTER — READMISSION MANAGEMENT (OUTPATIENT)
Dept: CALL CENTER | Facility: HOSPITAL | Age: 77
End: 2022-12-16

## 2022-12-16 ENCOUNTER — HOME HEALTH ADMISSION (OUTPATIENT)
Dept: HOME HEALTH SERVICES | Facility: HOME HEALTHCARE | Age: 77
End: 2022-12-16
Payer: COMMERCIAL

## 2022-12-16 ENCOUNTER — TRANSCRIBE ORDERS (OUTPATIENT)
Dept: HOME HEALTH SERVICES | Facility: HOME HEALTHCARE | Age: 77
End: 2022-12-16

## 2022-12-16 VITALS
HEART RATE: 74 BPM | TEMPERATURE: 99.4 F | OXYGEN SATURATION: 99 % | RESPIRATION RATE: 18 BRPM | WEIGHT: 179.9 LBS | HEIGHT: 67 IN | BODY MASS INDEX: 28.24 KG/M2 | DIASTOLIC BLOOD PRESSURE: 57 MMHG | SYSTOLIC BLOOD PRESSURE: 114 MMHG

## 2022-12-16 DIAGNOSIS — Z96.642 S/P TOTAL LEFT HIP ARTHROPLASTY: Primary | ICD-10-CM

## 2022-12-16 LAB
ANION GAP SERPL CALCULATED.3IONS-SCNC: 5.8 MMOL/L (ref 5–15)
BUN SERPL-MCNC: 21 MG/DL (ref 8–23)
BUN/CREAT SERPL: 17.1 (ref 7–25)
CALCIUM SPEC-SCNC: 8.5 MG/DL (ref 8.6–10.5)
CHLORIDE SERPL-SCNC: 101 MMOL/L (ref 98–107)
CO2 SERPL-SCNC: 26.2 MMOL/L (ref 22–29)
CREAT SERPL-MCNC: 1.23 MG/DL (ref 0.76–1.27)
DEPRECATED RDW RBC AUTO: 37.9 FL (ref 37–54)
EGFRCR SERPLBLD CKD-EPI 2021: 60.1 ML/MIN/1.73
ERYTHROCYTE [DISTWIDTH] IN BLOOD BY AUTOMATED COUNT: 12.2 % (ref 12.3–15.4)
GLUCOSE SERPL-MCNC: 134 MG/DL (ref 65–99)
HCT VFR BLD AUTO: 32.6 % (ref 37.5–51)
HGB BLD-MCNC: 10.8 G/DL (ref 13–17.7)
MCH RBC QN AUTO: 28.1 PG (ref 26.6–33)
MCHC RBC AUTO-ENTMCNC: 33.1 G/DL (ref 31.5–35.7)
MCV RBC AUTO: 84.9 FL (ref 79–97)
PLATELET # BLD AUTO: 205 10*3/MM3 (ref 140–450)
PMV BLD AUTO: 10.2 FL (ref 6–12)
POTASSIUM SERPL-SCNC: 4.4 MMOL/L (ref 3.5–5.2)
RBC # BLD AUTO: 3.84 10*6/MM3 (ref 4.14–5.8)
SODIUM SERPL-SCNC: 133 MMOL/L (ref 136–145)
WBC NRBC COR # BLD: 13.41 10*3/MM3 (ref 3.4–10.8)

## 2022-12-16 PROCEDURE — 97530 THERAPEUTIC ACTIVITIES: CPT

## 2022-12-16 PROCEDURE — 97116 GAIT TRAINING THERAPY: CPT

## 2022-12-16 PROCEDURE — G0378 HOSPITAL OBSERVATION PER HR: HCPCS

## 2022-12-16 PROCEDURE — 85027 COMPLETE CBC AUTOMATED: CPT | Performed by: ORTHOPAEDIC SURGERY

## 2022-12-16 PROCEDURE — 80048 BASIC METABOLIC PNL TOTAL CA: CPT | Performed by: ORTHOPAEDIC SURGERY

## 2022-12-16 RX ADMIN — CLOPIDOGREL 75 MG: 75 TABLET, FILM COATED ORAL at 08:38

## 2022-12-16 RX ADMIN — LISINOPRIL 20 MG: 20 TABLET ORAL at 08:38

## 2022-12-16 RX ADMIN — ASPIRIN 81 MG: 81 TABLET, COATED ORAL at 08:38

## 2022-12-16 RX ADMIN — AMLODIPINE BESYLATE 5 MG: 5 TABLET ORAL at 08:38

## 2022-12-16 RX ADMIN — MUPIROCIN 1 APPLICATION: 20 OINTMENT TOPICAL at 08:38

## 2022-12-16 RX ADMIN — FERROUS SULFATE TAB 325 MG (65 MG ELEMENTAL FE) 325 MG: 325 (65 FE) TAB at 08:38

## 2022-12-16 RX ADMIN — Medication 10 ML: at 08:39

## 2022-12-16 NOTE — PROGRESS NOTES
Orthopaedic Surgery  Progress Note  12/16/2022    Patients Name:  Mitesh Durham  YOB: 1945  Age:  77 y.o.  Medical Records Number:  [id number]  Date of Admission: 12/15/2022    No complaints except appropriate pain and stiffness in the left hip    Vitals:  Vitals:    12/15/22 1746 12/15/22 2220 12/16/22 0219 12/16/22 0634   BP: 104/48 114/62 114/58 132/74   BP Location: Right arm Right arm Left arm Left arm   Patient Position: Sitting Lying Lying Lying   Pulse: 70 63 64 74   Resp: 18 18 17 17   Temp: 98.1 °F (36.7 °C) 98.4 °F (36.9 °C) 99.8 °F (37.7 °C) 100 °F (37.8 °C)   TempSrc: Oral Oral Oral Oral   SpO2: 94% 92% 92% 91%   Weight:       Height:           LLE:  NVI, calf nontender, Sensation intact  No signs of DVT    Incision: clean, no signs of infection    Lab Results (last 24 hours)     Procedure Component Value Units Date/Time    Basic Metabolic Panel [[id number]]  (Abnormal) Collected: 12/16/22 0555    Specimen: Blood Updated: 12/16/22 0701     Glucose 134 mg/dL      BUN 21 mg/dL      Creatinine 1.23 mg/dL      Sodium 133 mmol/L      Potassium 4.4 mmol/L      Chloride 101 mmol/L      CO2 26.2 mmol/L      Calcium 8.5 mg/dL      BUN/Creatinine Ratio 17.1     Anion Gap 5.8 mmol/L      eGFR 60.1 mL/min/1.73      Comment: National Kidney Foundation and American Society of Nephrology (ASN) Task Force recommended calculation based on the Chronic Kidney Disease Epidemiology Collaboration (CKD-EPI) equation refit without adjustment for race.       Narrative:      GFR Normal >60  Chronic Kidney Disease <60  Kidney Failure <15    The GFR formula is only valid for adults with stable renal function between ages 18 and 70.    CBC (No Diff) [[id number]]  (Abnormal) Collected: 12/16/22 0555    Specimen: Blood Updated: 12/16/22 0651     WBC 13.41 10*3/mm3      RBC 3.84 10*6/mm3      Hemoglobin 10.8 g/dL      Hematocrit 32.6 %      MCV 84.9 fL      MCH 28.1 pg      MCHC 33.1 g/dL      RDW 12.2 %      RDW-SD  37.9 fl      MPV 10.2 fL      Platelets 205 10*3/mm3           XR Hip 1 View Without Pelvis Left (Surgery Only)    Result Date: 12/15/2022  Narrative: XR HIP 1 VIEW WO PELVIS LEFT-  12/15/2022  HISTORY: Postop left hip arthroplasty.  The acetabular and proximal femoral components of the left hip prosthesis are seen in good position. No unexpected findings are noted. Soft tissue air is seen.      Impression: 1. Satisfactory postoperative appearance of the left hip.  This report was finalized on 12/15/2022 9:34 AM by Dr. Richard Degroot M.D.      Peripheral Block    Result Date: 12/15/2022  Narrative: Gerardo Riddle MD     12/15/2022  6:46 AM Peripheral Block Patient reassessed immediately prior to procedure Patient location during procedure: pre-op Start time: 12/15/2022 6:30 AM Stop time: 12/15/2022 6:40 AM Reason for block: at surgeon's request and post-op pain management Performed by Anesthesiologist: Gerardo Riddle MD Preanesthetic Checklist Completed: patient identified, IV checked, site marked, risks and benefits discussed, surgical consent, monitors and equipment checked, pre-op evaluation and timeout performed Prep: Pt Position: supine Sterile barriers:gloves and cap Prep: ChloraPrep Patient monitoring: blood pressure monitoring, continuous pulse oximetry and EKG Procedure Sedation: yes Guidance:ultrasound guided ULTRASOUND INTERPRETATION.  Using ultrasound guidance a 21 G gauge needle was placed in close proximity to the nerve, at which point, under ultrasound guidance anesthetic was injected in the area of the nerve and spread of the anesthesia was seen on ultrasound in close proximity thereto.  There were no abnormalities seen on ultrasound; a digital image was taken; and the patient tolerated the procedure with no complications. Images:still images obtained, printed/placed on chart Laterality:left Block Type:fascia iliaca compartment Injection Technique:single-shot Needle Type:echogenic Needle  Gauge:21 G Resistance on Injection: none Medications Used: bupivacaine-EPINEPHrine PF (MARCAINE w/EPI) 0.25% -1:[id number] injection - Injection  50 mL - 12/15/2022 6:40:00 AM Post Assessment Injection Assessment: negative aspiration for heme, no paresthesia on injection and incremental injection Patient Tolerance:comfortable throughout block Complications:no Additional Notes Ultrasound guidance was used to view and verify needle placement and medication disbursement.    XR Chest PA & Lateral, XR Hip With or Without Pelvis 2 - 3 View Left    Result Date: 12/7/2022  Narrative: TWO-VIEW LEFT HIP AND ONE VIEW AP PELVIS  HISTORY: Left hip pain. Preop for hip surgery.  FINDINGS: There are extremely severe degenerative changes on the left hip more than right with very marked joint space narrowing and subchondral degenerative cystic change and sclerosis as well as marginal spurring.  TWO-VIEW CHEST  HISTORY: Preop for hip surgery. Hypertension.  FINDINGS: The lungs are well-expanded and clear. The heart and hilar structures are within normal limits with sternal wires from previous cardiac surgery. There is no acute disease or change from 01/16/2017.  This report was finalized on 12/7/2022 4:37 PM by Dr. Denilson Gaspar M.D.      XR Hip With or Without Pelvis 1 View Left    Result Date: 12/15/2022  Narrative: XR HIP W OR WO PELVIS 1 VIEW LEFT-  12/15/2022  HISTORY: Left hip arthroplasty  Single intraoperative view shows the acetabular component of the left hip prosthesis in good position. Femoral drill device is seen in good position. There is been a femoral neck osteotomy. Soft tissue air is seen. No unexpected findings are noted.  This report was finalized on 12/15/2022 9:35 AM by Dr. Richard Degroot M.D.          Assessment/Plan:    Procedures: Left JESSIE  Post-operative Day:  1  Weightbearing Status:  WBAT with walker  DVT Prophylaxis: Plavix/ASA for DVT prophylaxis    Dispostition:  Home with home health after PT today, if  comfortable and mobilizing safely    Israel Ty PA-C  [facility] [place] [id number] [phone]    12/16/2022

## 2022-12-16 NOTE — PLAN OF CARE
Goal Outcome Evaluation:  Plan of Care Reviewed With: patient, spouse        Progress: improving  Outcome Evaluation: Pt received in bed upon arrival and agreeable to PT. Pt completed JESSIE protocol x 10 requiring assist secondary to post-op pain and weakness. Pt reached sitting EOB requiring CGA and increased time to perform. Pt stood, ambulated 100' c RW, and completed 4 steps c HR requiring CGA. Pt presents with a slow step-to pattern but no unsteadiness. PT provided education regarding HEP, home safety, precautions, and post-op care. Pt plans to D/C home today with assist.

## 2022-12-16 NOTE — PLAN OF CARE
Goal Outcome Evaluation:  Plan of Care Reviewed With: patient      Vss, nvi, DTV at 1650, unable to void, bladder scan 330, pain controlled with PO meds, dressing c/d/I, plans to d/c home tomorrow, educated on bp monitoring and meds.   Progress: improving

## 2022-12-16 NOTE — PLAN OF CARE
Goal Outcome Evaluation:           Progress: improving  Pt is a post op day 1 of a left total hip. Dressing is clean dry and intact. Pt continues with the Island dressing.  Pt has ambulated to the bathroom with an assist x2, but has not been able to void on his own. Family remains at bedside. Pt continues with PO pain meds. Pt educated on the importance of monitoring blood pressures related to comorbidity of HTN. Pt voiced understanding. Pt is resting at this time, will continue to monitor.

## 2022-12-16 NOTE — THERAPY TREATMENT NOTE
Patient Name: Mitesh Durham  : 1945    MRN: [id number]                              Today's Date: 2022       Admit Date: 12/15/2022    Visit Dx:     ICD-10-CM ICD-9-CM   1. Primary osteoarthritis of left hip  M16.12 715.15     Patient Active Problem List   Diagnosis   • Right calf pain   • Right leg claudication (HCC)   • Femoral artery occlusion, left (HCC)   • Primary osteoarthritis of left hip     Past Medical History:   Diagnosis Date   • CAD (coronary artery disease)     CABG X3   • History of kidney stones    • Hyperlipidemia    • Hypertension    • MI (myocardial infarction) (HCC)    • OA (osteoarthritis) of hip     LEFT HIP PAIN, LIMITED MOBILITY AND SL WEAKNESS     Past Surgical History:   Procedure Laterality Date   • CARDIAC CATHETERIZATION     • CORONARY ARTERY BYPASS GRAFT  1999    X3   • FRACTURE SURGERY Right     NATTY UPPER ARM   • KIDNEY STONE SURGERY Right     UNCLEAR WHAT PROCEDURE PT HAD DONE   • TOTAL HIP ARTHROPLASTY Left 12/15/2022    Procedure: LEFT TOTAL HIP ARTHROPLASTY- POSTERIOR;  Surgeon: Israel Don MD;  Location: [facility] OR [facility];  Service: Orthopedics;  Laterality: Left;   • VASCULAR SURGERY Left     LEFT VEIN SURGERY: UNCLEAR      General Information     Row Name 22          Physical Therapy Time and Intention    Document Type therapy note (daily note)  -CS     Mode of Treatment individual therapy;physical therapy  -CS     Row Name 22          General Information    Patient Profile Reviewed yes  -CS     Existing Precautions/Restrictions fall;left;hip, posterior  -CS     Row Name 22          Cognition    Orientation Status (Cognition) oriented x 3  -CS     Row Name 22          Safety Issues, Functional Mobility    Impairments Affecting Function (Mobility) endurance/activity tolerance;pain;strength  -CS           User Key  (r) = Recorded By, (t) = Taken By, (c) = Cosigned By    Initials Name Provider Type    ROBERT Larson  Amy, DARIO Physical Therapist               Mobility     Row Name 12/16/22 0927          Bed Mobility    Bed Mobility supine-sit  -CS     Supine-Sit [place] (Bed Mobility) contact guard  -CS     Assistive Device (Bed Mobility) bed rails;head of bed elevated  -CS     Comment, (Bed Mobility) UIC at end of session  -CS     Row Name 12/16/22 0927          Sit-Stand Transfer    Sit-Stand [place] (Transfers) contact guard;verbal cues  -CS     Assistive Device (Sit-Stand Transfers) walker, front-wheeled  -CS     Comment, (Sit-Stand Transfer) x 2 from bed & chair; cues for hand placement  -CS     Row Name 12/16/22 0927          Gait/Stairs (Locomotion)    [place] Level (Gait) contact guard;verbal cues  -CS     Assistive Device (Gait) walker, front-wheeled  -CS     Distance in Feet (Gait) 100'  -CS     Deviations/Abnormal Patterns (Gait) antalgic;iliana decreased;stride length decreased  -CS     Left Sided Gait Deviations weight shift ability decreased  -CS     [place] Level (Stairs) contact guard;verbal cues  -CS     Assistive Device (Stairs) other (see comments)  HR  -CS     Handrail Location (Stairs) both sides  -CS     Number of Steps (Stairs) 4  -CS     Ascending Technique (Stairs) step-to-step  -CS     Descending Technique (Stairs) step-to-step  -CS     Comment, (Gait/Stairs) slow step-to pattern but no unsteadiness  -CS     Row Name 12/16/22 0927          Mobility    Extremity Weight-bearing Status left lower extremity  -CS     Left Lower Extremity (Weight-bearing Status) weight-bearing as tolerated (WBAT)  -CS           User Key  (r) = Recorded By, (t) = Taken By, (c) = Cosigned By    Initials Name Provider Type    CS Amy Larson, PT Physical Therapist               Obj/Interventions     Row Name 12/16/22 0928          Motor Skills    Therapeutic Exercise other (see comments)  10 reps JESSIE protocol - required assist secondary to post-op pain and weakness  -CS     Row Name 12/16/22 0928           Balance    Balance Assessment sitting static balance;sitting dynamic balance;standing static balance;standing dynamic balance  -CS     Static Sitting Balance standby assist  -CS     Dynamic Sitting Balance standby assist  -CS     Position, Sitting Balance unsupported;sitting edge of bed  -CS     Static Standing Balance contact guard  -CS     Dynamic Standing Balance contact guard  -CS     Position/Device Used, Standing Balance supported;walker, front-wheeled  -CS           User Key  (r) = Recorded By, (t) = Taken By, (c) = Cosigned By    Initials Name Provider Type    CS Amy Larson, PT Physical Therapist               Goals/Plan    No documentation.                Clinical Impression     Row Name 12/16/22 0929          Pain    Pretreatment Pain Rating 2/10  -CS     Posttreatment Pain Rating 2/10  -CS     Pain Location - Side/Orientation Left  -CS     Pain Location incisional  -CS     Pain Location - hip  -CS     Pain Intervention(s) Rest;Repositioned;Ambulation/increased activity  -     Row Name 12/16/22 0929          Plan of Care Review    Plan of Care Reviewed With patient;spouse  -CS     Progress improving  -     Outcome Evaluation Pt received in bed upon arrival and agreeable to PT. Pt completed JESSIE protocol x 10 requiring assist secondary to post-op pain and weakness. Pt reached sitting EOB requiring CGA and increased time to perform. Pt stood, ambulated 100' c RW, and completed 4 steps c HR requiring CGA. Pt presents with a slow step-to pattern but no unsteadiness. PT provided education regarding HEP, home safety, precautions, and post-op care. Pt plans to D/C home today with assist.  -     Row Name 12/16/22 0929          Therapy Assessment/Plan (PT)    Criteria for Skilled Interventions Met (PT) yes;meets criteria  -CS     Therapy Frequency (PT) daily  -     Row Name 12/16/22 0929          Positioning and Restraints    Pre-Treatment Position in bed  -CS     Post Treatment Position chair  -CS      In Chair reclined;call light within reach;encouraged to call for assist;exit alarm on;with family/caregiver;heels elevated  -CS           User Key  (r) = Recorded By, (t) = Taken By, (c) = Cosigned By    Initials Name Provider Type    Amy Cee, DARIO Physical Therapist               Outcome Measures     Row Name 12/16/22 0931          How much help from another person do you currently need...    Turning from your back to your side while in flat bed without using bedrails? 4  -CS     Moving from lying on back to sitting on the side of a flat bed without bedrails? 3  -CS     Moving to and from a bed to a chair (including a wheelchair)? 3  -CS     Standing up from a chair using your arms (e.g., wheelchair, bedside chair)? 3  -CS     Climbing 3-5 steps with a railing? 3  -CS     To walk in hospital room? 3  -CS     AM-PAC 6 Clicks Score (PT) 19  -CS     Highest level of mobility 6 --> Walked 10 steps or more  -CS     Row Name 12/16/22 0931          Functional Assessment    Outcome Measure Options AM-PAC 6 Clicks Basic Mobility (PT)  -CS           User Key  (r) = Recorded By, (t) = Taken By, (c) = Cosigned By    Initials Name Provider Type    Amy Cee PT Physical Therapist                             Physical Therapy Education     Title: PT OT SLP Therapies (Done)     Topic: Physical Therapy (Done)     Point: Mobility training (Done)     Learning Progress Summary           Patient Acceptance, E,TB, VU,DU by  at 12/16/2022 0931    Acceptance, E,TB, VU,DU by  at 12/15/2022 1502   Family Acceptance, E,TB, VU,DU by CS at 12/16/2022 0931    Acceptance, E,TB, VU,DU by CS at 12/15/2022 1502                   Point: Home exercise program (Done)     Learning Progress Summary           Patient Acceptance, E,TB, VU,DU by CS at 12/16/2022 0931    Acceptance, E,TB, VU,DU by CS at 12/15/2022 1502   Family Acceptance, E,TB, VU,DU by CS at 12/16/2022 0931    Acceptance, E,TB, VU,DU by CS at 12/15/2022 1502                    Point: Body mechanics (Done)     Learning Progress Summary           Patient Acceptance, E,TB, VU,DU by  at 12/16/2022 0931    Acceptance, E,TB, VU,DU by  at 12/15/2022 1502   Family Acceptance, E,TB, VU,DU by CS at 12/16/2022 0931    Acceptance, E,TB, VU,DU by CS at 12/15/2022 1502                   Point: Precautions (Done)     Learning Progress Summary           Patient Acceptance, E,TB, VU,DU by  at 12/16/2022 0931    Acceptance, E,TB, VU,DU by CS at 12/15/2022 1502   Family Acceptance, E,TB, VU,DU by  at 12/16/2022 0931    Acceptance, E,TB, VU,DU by  at 12/15/2022 1502                               User Key     Initials Effective Dates Name Provider Type Discipline     09/22/22 -  Amy Larson, PT Physical Therapist PT              PT Recommendation and Plan     Plan of Care Reviewed With: patient, spouse  Progress: improving  Outcome Evaluation: Pt received in bed upon arrival and agreeable to PT. Pt completed JESSIE protocol x 10 requiring assist secondary to post-op pain and weakness. Pt reached sitting EOB requiring CGA and increased time to perform. Pt stood, ambulated 100' c RW, and completed 4 steps c HR requiring CGA. Pt presents with a slow step-to pattern but no unsteadiness. PT provided education regarding HEP, home safety, precautions, and post-op care. Pt plans to D/C home today with assist.     Time Calculation:    PT Charges     Row Name 12/16/22 0931             Time Calculation    Start Time 0844  -CS      Stop Time 0920  -CS      Time Calculation (min) 36 min  -CS      PT Received On 12/16/22  -      PT - Next Appointment 12/17/22  -CS         Time Calculation- PT    Total Timed Code Minutes- PT 34 minute(s)  -CS         Timed Charges    96205 - Gait Training Minutes  20  -CS      91674 - PT Therapeutic Activity Minutes 14  -CS         Total Minutes    Timed Charges Total Minutes 34  -CS       Total Minutes 34  -CS            User Key  (r) = Recorded By, (t) = Taken By,  (c) = Cosigned By    Initials Name Provider Type    CS Amy Larson, PT Physical Therapist              Therapy Charges for Today     Code Description Service Date Service Provider Modifiers Qty    73923552373 HC GAIT TRAINING EA 15 MIN 12/15/2022 Amy Larson, PT GP 1    92082846169 HC PT THERAPEUTIC ACT EA 15 MIN 12/15/2022 Amy Larson, PT GP 1    92224863215 HC PT EVAL LOW COMPLEXITY 3 12/15/2022 Amy Larson, PT GP 1    87822660728 HC GAIT TRAINING EA 15 MIN 12/16/2022 Amy Larson, PT GP 1    07593719458 HC PT THERAPEUTIC ACT EA 15 MIN 12/16/2022 Amy Larson, PT GP 1          PT G-Codes  Outcome Measure Options: AM-PAC 6 Clicks Basic Mobility (PT)  AM-PAC 6 Clicks Score (PT): 19  PT Discharge Summary  Anticipated Discharge Disposition (PT): home with assist, home with home health    Amy Larson PT  12/16/2022

## 2022-12-16 NOTE — OUTREACH NOTE
Prep Survey    Flowsheet Row Responses   [facility] facility patient discharged from? [place]   Is LACE score < 7 ? Yes   Eligibility [facility]   Date of Admission 12/15/22   Date of Discharge 12/16/22   Discharge Disposition Home or Self Care   Discharge diagnosis Left total hip arthro   Does the patient have one of the following disease processes/diagnoses(primary or secondary)? Total Joint Replacement   Does the patient have Home health ordered? Yes   What is the Home health agency?   [facility]   Is there a DME ordered? No   Prep survey completed? Yes          MELISSA RADER - Registered Nurse

## 2022-12-16 NOTE — PLAN OF CARE
Goal Outcome Evaluation:         Patient with no complaints of pain this morning. Wife at bedside and an active participant in care. Discharge instructions given and incentive spirometer explained importance of usage.

## 2022-12-16 NOTE — PROGRESS NOTES
Patient is discharging today . Spoke to patient and spouse who are agreeable to home health . Wife / Sarah has requested we call her for visit scheduling [phone]. Patient has no current home health . Will transcribe home candis orders for home health PT per ortho care plan. Thank you!

## 2022-12-16 NOTE — PROGRESS NOTES
Case Management Discharge Note      Final Note: Discharged home with [facility]. Shalini Rivera RN             Home Medical Care Coordination complete.    Service Provider Selected Services Address Phone Fax Patient Preferred    [facility] [address] [address] [phone] --         Transportation Services  Private: Car    Final Discharge Disposition Code: 06 - home with home health care

## 2022-12-17 ENCOUNTER — HOME CARE VISIT (OUTPATIENT)
Dept: HOME HEALTH SERVICES | Facility: HOME HEALTHCARE | Age: 77
End: 2022-12-17
Payer: COMMERCIAL

## 2022-12-17 VITALS
SYSTOLIC BLOOD PRESSURE: 112 MMHG | OXYGEN SATURATION: 97 % | TEMPERATURE: 98.4 F | DIASTOLIC BLOOD PRESSURE: 60 MMHG | HEART RATE: 69 BPM

## 2022-12-17 PROCEDURE — G0151 HHCP-SERV OF PT,EA 15 MIN: HCPCS

## 2022-12-17 NOTE — HOME HEALTH
REASON FOR REFERRAL:  decreased ability to ambulate in and out of the home following recent hospital stay for L posterior JESSIE on 12/15/22 by Dr. Don resulting in functional decline and LE weakness.    MEDICAL HISTORY: Coronary artery disease, CABG X3, History of kidney stones, Hyperlipidemia, Hypertension, Myocardial infarction.    SKILLED PHYSICAL THERAPY IS MEDICALLY NECESSARY FOR: Instruction/education in lower extremity strengthening HEP, bed mobility/transfers training, gait training, balance training, fall prevention, and activity tolerance training to enable patient to safely exit home for medical appointments.    WBAT L LE    Change the dressing as needed to keep incision clean and dry using island dressing or 4x4 gauze and paper tape. Patient may remove dressing once the incision is free of drainage.      Patient may shower and let water run over the incision on post-operative day #5 if no drainage.  Patient is to not scrub or rub the incision, simply let the water run over the incision and pat dry.    Patient lives at home with spouse with steps and rail to enter home.  Patient demonstrated the ability to ambulate for 55 feet with rolling walker and SBA with antalgic gait pattern due to pain in L LE.  Patient performed bed mobility with SBA and transfer tasks with SBA.  Patient was started on supine and sitting HEP with handout provided.  Frequency: 1w1, 2w2.

## 2022-12-19 ENCOUNTER — HOME CARE VISIT (OUTPATIENT)
Dept: HOME HEALTH SERVICES | Facility: HOME HEALTHCARE | Age: 77
End: 2022-12-19
Payer: COMMERCIAL

## 2022-12-19 ENCOUNTER — TRANSITIONAL CARE MANAGEMENT TELEPHONE ENCOUNTER (OUTPATIENT)
Dept: CALL CENTER | Facility: HOSPITAL | Age: 77
End: 2022-12-19

## 2022-12-19 VITALS
SYSTOLIC BLOOD PRESSURE: 132 MMHG | RESPIRATION RATE: 17 BRPM | OXYGEN SATURATION: 97 % | HEART RATE: 88 BPM | DIASTOLIC BLOOD PRESSURE: 78 MMHG | TEMPERATURE: 97.8 F

## 2022-12-19 PROCEDURE — G0151 HHCP-SERV OF PT,EA 15 MIN: HCPCS

## 2022-12-19 NOTE — OUTREACH NOTE
Call Center TCM Note    Flowsheet Row Responses   [facility] patient discharged from? [place]   Does the patient have one of the following disease processes/diagnoses(primary or secondary)? Total Joint Replacement   Joint surgery performed? Hip   TCM attempt successful? Yes   Call start time 1109   Call end time 1115   Discharge diagnosis Left total hip arthro   Does the patient have all medications related to this admission filled (includes all antibiotics, pain medications, etc.) Yes   Is the patient taking all medications as directed (includes completed medication regime)? Yes   Is the patient able to teach back alternate methods of pain control? Ice, Reposition, Short, frequent activity   Comments Pt needs to call surgeon and PCP for appt.   Does the patient have an appointment with their PCP within 7 days of discharge? No   What is the Home health agency?   [facility]   Has home health visited the patient within 72 hours of discharge? Yes   Psychosocial issues? No   Has the patient began therapy sessions (either in the home or as an out patient)? Yes   Does the patient have a wound vac in place? N/A   Has the patient fallen since discharge? No   Did the patient receive a copy of their discharge instructions? Yes   Nursing interventions Reviewed instructions with patient   What is the patient's perception of their functional status since discharge? Improving   Is the patient able to teach back signs and symptoms of infection? Temp >100.4 for 24h or longer, Incisional drainage, Blisters around incision, Increased swelling or redness around incision (not associated with surgical edema), Severe discomfort or pain, Changes in mobility, Shortness of breath or chest pain   Is the patient able to teach back how to prevent infection? Check incision daily, Wash hands before and after touching incision, Keep incision covered if drainage, Eat well-balanced diet, No tub baths, hot tub or swimming, No lotion or creams,  Keep incision covered if pets in house, Shower only as directed by surgeon   Is the patient able to teach back signs and symptoms of DVT? Shortness of breath or chest pain, Area hot to touch, Redness in calf, Swelling in calf   If the patient is a current smoker, are they able to teach back resources for cessation? Smoking cessation medications, [id number]   Is the patient/caregiver able to teach back the hierarchy of who to call/visit for symptoms/problems? PCP, Specialist, Home health nurse, Urgent Care, ED, 911 Yes   Additional teach back comments States he is doing well and doing well with therapy.  They have changed his dressing.   TCM call completed? Yes   Wrap up additional comments Denies questions or needs at this time.   Call end time 1115          Julissa Khoury LPN    12/19/2022, 11:27 EST

## 2022-12-20 NOTE — HOME HEALTH
SUBJECTIVE: Evelyne been sleeping in the bed.  I use that belt to help lift that leg in bed. I think I'm going to take a shower tomorrow    DATE OF NEXT APPOINTMENT WITH DOCTOR:  to be scheduled 3 weeks post-op    ASSESSMENT: overall improvement in pain and gait quality.  apparent compliance with HEP  ____________    PLAN FOR NEXT VISIT:   MEDICAL NECESSITY FOR ONGOING SKILLED THERAPY:  skilled physical therapy medically necessary for treatment of: pain, gait deviations, transfer deficits following recent   JESSIE           .Requires instruction in appropriate progression of exercises; education in fall prevention/pain/edema management; gait training to reduce reliance on assistive device; balance retraining to prevent falls.  Without skilled physical therapy, patient at risk for: falls, chronic gait deficits; pain      SPECIFIC INTERVENTIONS AND GOALS TO ADDRESS ON NEXT VISIT:  - progress HEP  ; review using teachback method  - gait training to restore normal mechanics  - fall prevention education    FREQUENCY AND DURATION:  - 1 week 1; 2 week 1 additional visits    ANY OTHER FOLLOW UP NEEDED: none    REASSESSMENT DUE DATE: 30 day: 1/16/23

## 2022-12-21 ENCOUNTER — TELEPHONE (OUTPATIENT)
Dept: ORTHOPEDIC SURGERY | Facility: HOSPITAL | Age: 77
End: 2022-12-21

## 2022-12-21 NOTE — TELEPHONE ENCOUNTER
Called and spoke with Mr. Durham to see how he is doing as he is 6 days SP [facility]. He said things are going really well. He is working with PT and progressing. He is walking around and doing great. Pain has been minimal compared to what he had been dealing with before the surgery. BM's are good. Dressing looks good. Mr. Durham doesn't have any questions/concerns for me at this time. He was given my contact information should he need anything. He voiced understanding and appreciated the call.

## 2022-12-22 ENCOUNTER — HOME CARE VISIT (OUTPATIENT)
Dept: HOME HEALTH SERVICES | Facility: HOME HEALTHCARE | Age: 77
End: 2022-12-22
Payer: COMMERCIAL

## 2022-12-22 PROCEDURE — G0151 HHCP-SERV OF PT,EA 15 MIN: HCPCS

## 2022-12-23 VITALS
DIASTOLIC BLOOD PRESSURE: 68 MMHG | RESPIRATION RATE: 18 BRPM | OXYGEN SATURATION: 95 % | HEART RATE: 78 BPM | SYSTOLIC BLOOD PRESSURE: 122 MMHG | TEMPERATURE: 97.2 F

## 2022-12-23 NOTE — HOME HEALTH
"SUBJECTIVE: patient answered door without aid of AD. States is doing \"fine\" with \"really no pain\".  states son ordered cane and it should be here for next PT visit  DATE OF NEXT APPOINTMENT WITH DOCTOR: to be scheduled 3 weeks post-op   ASSESSMENT: patient feels that he is nearing functional baseline despite recent hip surgery.  would benefit from increased overall activity however patient reports that his baseline is \"laying around\"   ____________   PLAN FOR NEXT VISIT:   MEDICAL NECESSITY FOR ONGOING SKILLED THERAPY: skilled physical therapy medically necessary for treatment of: pain, gait deviations, transfer deficits following recent JESSIE .Requires instruction in appropriate progression of exercises; education in fall prevention/pain/edema management; gait training to reduce reliance on assistive device; balance retraining to prevent falls. Without skilled physical therapy, patient at risk for: falls, chronic gait deficits; pain   SPECIFIC INTERVENTIONS AND GOALS TO ADDRESS ON NEXT VISIT:   - progress HEP ; review using teachback method   - gait training to restore normal mechanics - instruct in use of cane pending patient acquisition  - fall prevention education     FREQUENCY AND DURATION:   -   2 week 1 additional visits   ANY OTHER FOLLOW UP NEEDED: none   REASSESSMENT DUE DATE: 30 day: 1/16/23"

## 2022-12-24 ENCOUNTER — HOME CARE VISIT (OUTPATIENT)
Dept: HOME HEALTH SERVICES | Facility: HOME HEALTHCARE | Age: 77
End: 2022-12-24
Payer: COMMERCIAL

## 2022-12-27 NOTE — HOME HEALTH
received message from Dayanara Patton RN that patient was admitted to [place] via EMS on 12/24/22 due to becoming unconscious at home.  Patient was diagnosed with UTI and admitted to ICU.  Cultures came back positive for ecoli

## 2022-12-28 ENCOUNTER — TRANSCRIBE ORDERS (OUTPATIENT)
Dept: HOME HEALTH SERVICES | Facility: HOME HEALTHCARE | Age: 77
End: 2022-12-28

## 2022-12-28 DIAGNOSIS — Z96.642 AFTERCARE FOLLOWING LEFT HIP JOINT REPLACEMENT SURGERY: Primary | ICD-10-CM

## 2022-12-28 DIAGNOSIS — Z47.1 AFTERCARE FOLLOWING LEFT HIP JOINT REPLACEMENT SURGERY: Primary | ICD-10-CM

## 2022-12-29 ENCOUNTER — HOME CARE VISIT (OUTPATIENT)
Dept: HOME HEALTH SERVICES | Facility: HOME HEALTHCARE | Age: 77
End: 2022-12-29
Payer: COMMERCIAL

## 2022-12-29 PROCEDURE — G0151 HHCP-SERV OF PT,EA 15 MIN: HCPCS

## 2022-12-29 NOTE — Clinical Note
Patient not readmitted to home health agency following return home from hospital as independently navigating home.  aware of HEP, verbalizes functional mobility at/near baseline.

## 2022-12-30 VITALS
RESPIRATION RATE: 18 BRPM | TEMPERATURE: 98.1 F | HEART RATE: 61 BPM | OXYGEN SATURATION: 96 % | DIASTOLIC BLOOD PRESSURE: 58 MMHG | SYSTOLIC BLOOD PRESSURE: 124 MMHG

## 2022-12-30 NOTE — HOME HEALTH
SUBJECTIVE: upon therapist arrival, patient still sleeping in bed.  ambulating in home without a device.  states that hip is \"just fine\".  reports he was recently hospitalized because his blood pressure dropped so low but since has realized that his wife gave him his BP meds in duplicate.  Reports that he has since discarded all BP meds and plans to discuss needs with PCP/cardiologist in upcoming future.  patient states that he has stopped smoking and drinking since returned home.    DATE OF NEXT APPOINTMENT WITH DOCTOR: 1/5/22 cardiology; 1/6/22 Dr Don    ASSESSMENT: patient able to teach back understanding of exercise and mobility recommendations however compliance with recommendations may be limited. no further skilled PT needs identified at this time. patent would benefit from increased activity and ambulation distance, but continues to report his baseline preference is limited activity. also recommend patient continue to take BP BID and document results with any concerns (BP greater than 140/90) to PCP/cardiologist. patient verbalized agreement.  incision healing well with no signs/symptoms of infection .     NO further home health visits at this time.

## (undated) DEVICE — DRSNG GZ PETROLTM XEROFORM CURAD 1X8IN STRL

## (undated) DEVICE — DECANTER BAG 9": Brand: MEDLINE INDUSTRIES, INC.

## (undated) DEVICE — ZIP 16 SURGICAL SKIN CLOSURE DEVICE, PSA: Brand: ZIP 16 SURGICAL SKIN CLOSURE DEVICE

## (undated) DEVICE — RECIPROCATING BLADE HEAVY DUTY LONG, OFFSET  (77.6 X 0.77 X 11.2MM)

## (undated) DEVICE — SUT TEVDEX 5 K61 30IN 79745

## (undated) DEVICE — APPL CHLORAPREP HI/LITE 26ML ORNG

## (undated) DEVICE — DRSNG WND GZ PAD BORDERED 4X8IN STRL

## (undated) DEVICE — DRAPE,REIN 53X77,STERILE: Brand: MEDLINE

## (undated) DEVICE — GLV SURG PREMIERPRO ORTHO LTX PF SZ7.5 BRN

## (undated) DEVICE — 3M™ IOBAN™ 2 ANTIMICROBIAL INCISE DRAPE 6648EZ: Brand: IOBAN™ 2

## (undated) DEVICE — HANDPIECE SET WITH COAXIAL HIGH FLOW TIP AND SUCTION TUBE: Brand: INTERPULSE

## (undated) DEVICE — PILLW ABD SM

## (undated) DEVICE — NEEDLE, QUINCKE, 20GX3.5": Brand: MEDLINE

## (undated) DEVICE — HEWSON SUTURE RETRIEVER: Brand: HEWSON SUTURE RETRIEVER

## (undated) DEVICE — SUT MNCRYL 3/0 PS2 18IN MCP497G

## (undated) DEVICE — PENCL ES MEGADINE EZ/CLEAN BUTN W/HOLSTR 10FT

## (undated) DEVICE — ANTIBACTERIAL UNDYED BRAIDED (POLYGLACTIN 910), SYNTHETIC ABSORBABLE SUTURE: Brand: COATED VICRYL

## (undated) DEVICE — SYR LUERLOK 30CC

## (undated) DEVICE — GLV SURG BIOGEL LTX PF 7 1/2

## (undated) DEVICE — PK HIP TOTL 40